# Patient Record
Sex: MALE | Race: WHITE | NOT HISPANIC OR LATINO
[De-identification: names, ages, dates, MRNs, and addresses within clinical notes are randomized per-mention and may not be internally consistent; named-entity substitution may affect disease eponyms.]

---

## 2017-08-09 PROBLEM — Z00.00 ENCOUNTER FOR PREVENTIVE HEALTH EXAMINATION: Status: ACTIVE | Noted: 2017-08-09

## 2017-09-28 ENCOUNTER — APPOINTMENT (OUTPATIENT)
Dept: CV DIAGNOSTICS | Facility: HOSPITAL | Age: 52
End: 2017-09-28

## 2017-10-09 ENCOUNTER — OUTPATIENT (OUTPATIENT)
Dept: OUTPATIENT SERVICES | Facility: HOSPITAL | Age: 52
LOS: 1 days | End: 2017-10-09
Payer: COMMERCIAL

## 2017-10-09 ENCOUNTER — APPOINTMENT (OUTPATIENT)
Dept: CV DIAGNOSTICS | Facility: HOSPITAL | Age: 52
End: 2017-10-09

## 2017-10-09 DIAGNOSIS — I25.10 ATHEROSCLEROTIC HEART DISEASE OF NATIVE CORONARY ARTERY WITHOUT ANGINA PECTORIS: ICD-10-CM

## 2017-10-10 PROCEDURE — 93016 CV STRESS TEST SUPVJ ONLY: CPT

## 2017-10-10 PROCEDURE — 78452 HT MUSCLE IMAGE SPECT MULT: CPT

## 2017-10-10 PROCEDURE — 93018 CV STRESS TEST I&R ONLY: CPT

## 2017-10-10 PROCEDURE — A9500: CPT

## 2017-10-10 PROCEDURE — 93017 CV STRESS TEST TRACING ONLY: CPT

## 2017-10-10 PROCEDURE — 78452 HT MUSCLE IMAGE SPECT MULT: CPT | Mod: 26

## 2017-10-23 ENCOUNTER — OUTPATIENT (OUTPATIENT)
Dept: OUTPATIENT SERVICES | Facility: HOSPITAL | Age: 52
LOS: 1 days | End: 2017-10-23
Payer: COMMERCIAL

## 2017-10-23 VITALS
DIASTOLIC BLOOD PRESSURE: 80 MMHG | SYSTOLIC BLOOD PRESSURE: 124 MMHG | RESPIRATION RATE: 22 BRPM | WEIGHT: 300.05 LBS | OXYGEN SATURATION: 98 % | TEMPERATURE: 98 F | HEART RATE: 75 BPM | HEIGHT: 72 IN

## 2017-10-23 DIAGNOSIS — R94.01 ABNORMAL ELECTROENCEPHALOGRAM [EEG]: ICD-10-CM

## 2017-10-23 DIAGNOSIS — R94.39 ABNORMAL RESULT OF OTHER CARDIOVASCULAR FUNCTION STUDY: ICD-10-CM

## 2017-10-23 DIAGNOSIS — Z98.52 VASECTOMY STATUS: Chronic | ICD-10-CM

## 2017-10-23 DIAGNOSIS — Z90.89 ACQUIRED ABSENCE OF OTHER ORGANS: Chronic | ICD-10-CM

## 2017-10-23 DIAGNOSIS — Z98.890 OTHER SPECIFIED POSTPROCEDURAL STATES: Chronic | ICD-10-CM

## 2017-10-23 LAB
ALBUMIN SERPL ELPH-MCNC: 4.6 G/DL — SIGNIFICANT CHANGE UP (ref 3.3–5)
ALP SERPL-CCNC: 65 U/L — SIGNIFICANT CHANGE UP (ref 40–120)
ALT FLD-CCNC: 46 U/L RC — HIGH (ref 10–45)
ANION GAP SERPL CALC-SCNC: 13 MMOL/L — SIGNIFICANT CHANGE UP (ref 5–17)
AST SERPL-CCNC: 50 U/L — HIGH (ref 10–40)
BILIRUB SERPL-MCNC: 0.4 MG/DL — SIGNIFICANT CHANGE UP (ref 0.2–1.2)
BUN SERPL-MCNC: 24 MG/DL — HIGH (ref 7–23)
CALCIUM SERPL-MCNC: 9.2 MG/DL — SIGNIFICANT CHANGE UP (ref 8.4–10.5)
CHLORIDE SERPL-SCNC: 98 MMOL/L — SIGNIFICANT CHANGE UP (ref 96–108)
CO2 SERPL-SCNC: 27 MMOL/L — SIGNIFICANT CHANGE UP (ref 22–31)
CREAT SERPL-MCNC: 1.09 MG/DL — SIGNIFICANT CHANGE UP (ref 0.5–1.3)
GLUCOSE SERPL-MCNC: 106 MG/DL — HIGH (ref 70–99)
HCT VFR BLD CALC: 44 % — SIGNIFICANT CHANGE UP (ref 39–50)
HGB BLD-MCNC: 15.5 G/DL — SIGNIFICANT CHANGE UP (ref 13–17)
MCHC RBC-ENTMCNC: 30.9 PG — SIGNIFICANT CHANGE UP (ref 27–34)
MCHC RBC-ENTMCNC: 35.2 GM/DL — SIGNIFICANT CHANGE UP (ref 32–36)
MCV RBC AUTO: 87.9 FL — SIGNIFICANT CHANGE UP (ref 80–100)
PLATELET # BLD AUTO: 246 K/UL — SIGNIFICANT CHANGE UP (ref 150–400)
POTASSIUM SERPL-MCNC: 5.4 MMOL/L — HIGH (ref 3.5–5.3)
POTASSIUM SERPL-SCNC: 5.4 MMOL/L — HIGH (ref 3.5–5.3)
PROT SERPL-MCNC: 7.8 G/DL — SIGNIFICANT CHANGE UP (ref 6–8.3)
RBC # BLD: 5.01 M/UL — SIGNIFICANT CHANGE UP (ref 4.2–5.8)
RBC # FLD: 12 % — SIGNIFICANT CHANGE UP (ref 10.3–14.5)
SODIUM SERPL-SCNC: 138 MMOL/L — SIGNIFICANT CHANGE UP (ref 135–145)
WBC # BLD: 7.4 K/UL — SIGNIFICANT CHANGE UP (ref 3.8–10.5)
WBC # FLD AUTO: 7.4 K/UL — SIGNIFICANT CHANGE UP (ref 3.8–10.5)

## 2017-10-23 PROCEDURE — 93458 L HRT ARTERY/VENTRICLE ANGIO: CPT | Mod: 26

## 2017-10-23 PROCEDURE — 99152 MOD SED SAME PHYS/QHP 5/>YRS: CPT

## 2017-10-23 PROCEDURE — C1894: CPT

## 2017-10-23 PROCEDURE — 93571 IV DOP VEL&/PRESS C FLO 1ST: CPT | Mod: 26,RC

## 2017-10-23 PROCEDURE — C1887: CPT

## 2017-10-23 PROCEDURE — 99153 MOD SED SAME PHYS/QHP EA: CPT

## 2017-10-23 PROCEDURE — 85027 COMPLETE CBC AUTOMATED: CPT

## 2017-10-23 PROCEDURE — 93010 ELECTROCARDIOGRAM REPORT: CPT

## 2017-10-23 PROCEDURE — C1769: CPT

## 2017-10-23 PROCEDURE — 93458 L HRT ARTERY/VENTRICLE ANGIO: CPT

## 2017-10-23 PROCEDURE — 93571 IV DOP VEL&/PRESS C FLO 1ST: CPT | Mod: RC

## 2017-10-23 PROCEDURE — 80053 COMPREHEN METABOLIC PANEL: CPT

## 2017-10-23 PROCEDURE — 93005 ELECTROCARDIOGRAM TRACING: CPT

## 2017-10-23 RX ORDER — LOSARTAN POTASSIUM 100 MG/1
1 TABLET, FILM COATED ORAL
Qty: 0 | Refills: 0 | COMMUNITY

## 2017-10-23 RX ORDER — OMEPRAZOLE 10 MG/1
1 CAPSULE, DELAYED RELEASE ORAL
Qty: 0 | Refills: 0 | COMMUNITY

## 2017-10-23 RX ORDER — AMLODIPINE BESYLATE 2.5 MG/1
1 TABLET ORAL
Qty: 0 | Refills: 0 | COMMUNITY

## 2017-10-23 RX ORDER — TADALAFIL 10 MG/1
1 TABLET, FILM COATED ORAL
Qty: 0 | Refills: 0 | COMMUNITY

## 2017-10-23 RX ORDER — FLUTICASONE PROPIONATE 50 MCG
2 SPRAY, SUSPENSION NASAL
Qty: 0 | Refills: 0 | COMMUNITY

## 2017-10-23 RX ORDER — METOPROLOL TARTRATE 50 MG
1 TABLET ORAL
Qty: 0 | Refills: 0 | COMMUNITY

## 2017-10-23 RX ORDER — FUROSEMIDE 40 MG
1 TABLET ORAL
Qty: 0 | Refills: 0 | COMMUNITY

## 2017-10-23 NOTE — H&P CARDIOLOGY - PREVIOUS OFFERED/RESULTED
< from: Nuclear Stress Test-Exercise (10.09.17 @ 09:50) >IMPRESSIONS:Abnormal Study< from: Nuclear Stress Test-Exercise (10.09.17 @ 09:50) >IMPRESSIONS:Abnormal Study

## 2017-10-23 NOTE — H&P CARDIOLOGY - HISTORY OF PRESENT ILLNESS
This is a 51 yo male with PMH of HTN, prediabets, GERD, BENTON on CPAP who  had abnormal stress test and presents for a cardiac angiogram.  The patient  was seen and evaluated by his cardiologist Dr Coffman and was complaining pf pedal edma. HE was started on diuretic and underwent a stress test that was found to be abnormal. He denies any CP, occ palpitations,  ALEXANDER with walking a flight of stair occassionally This is a 51 yo male with PMH of HTN, prediabets, GERD, BENTON on CPAP who  had abnormal stress test and presents for a cardiac angiogram.  The patient  was seen and evaluated by his cardiologist Dr Coffman and was complaining pf pedal edma. HE was started on diuretic and underwent a stress test that was found to be abnormal. He denies any CP, occ palpitations,  ALEXANDER with walking a flight of stair occassionally    < from: Nuclear Stress Test-Exercise (10.09.17 @ 09:50) >  MPRESSIONS:Abnormal Study  * Technically difficult study due to body habitus and  motion artifact.  * Exercise capacity: 10 METS, Average for age and gender.  * Chest Pain: No chest pain with exercise.  * Symptom: No Symptoms.  * HR Response: Appropriate.  * BP Response: Appropriate.  * Heart Rhythm: Sinus Rhythm - 93 BPM.  * Baseline ECG: Poor R wave progression.  Non-specific  ST-T wave abnormality.  * ECG Changes: No significant ischemic ST segment changes.  * Arrhythmia: None.  * Review of raw data shows: Significant motion artfact.  * The left ventricle was normal in size. There is a small,  mild to moderate defect in the apex that is mostly fixed  suggestive of scar with minimal ischemia.  * There are also mild defects in the basal to mid inferior  and basal to mid inferoseptal walls that are mostly fixed,  partially correct with prone imaging, and demonstrate  normal wall motion suggestive of attenuation artifact.  * Post-stress gated wall motion analysis was performed  (LVEF = 57 %;LVEDV = 102 ml.) revealing mildly reduced  systolic thickening of the apex with normal overall left  ventricular ejection fraction.  *** No previous Nuclear/Stress exam.  -----------------------------------------------------------    < end of copied text > This is a 53 yo male with PMH of HTN, prediabets, GERD, BENTON on CPAP who  had abnormal stress test and presents for a cardiac angiogram.  The patient  was seen and evaluated by his cardiologist Dr Coffman and was complaining pf pedal edma. HE was started on diuretic and underwent a stress test  ( results below) that was found to be abnormal. He denies any CP, syncope or near syncope,  occ palpitations,  ALEXANDER with walking a flight of stair occasionally. Patient referred to [facility] for further evaluation    < from: Nuclear Stress Test-Exercise (10.09.17 @ 09:50) >  MPRESSIONS:Abnormal Study  * Technically difficult study due to body habitus and  motion artifact.  * Exercise capacity: 10 METS, Average for age and gender.  * Chest Pain: No chest pain with exercise.  * Symptom: No Symptoms.  * HR Response: Appropriate.  * BP Response: Appropriate.  * Heart Rhythm: Sinus Rhythm - 93 BPM.  * Baseline ECG: Poor R wave progression.  Non-specific  ST-T wave abnormality.  * ECG Changes: No significant ischemic ST segment changes.  * Arrhythmia: None.  * Review of raw data shows: Significant motion artfact.  * The left ventricle was normal in size. There is a small,  mild to moderate defect in the apex that is mostly fixed  suggestive of scar with minimal ischemia.  * There are also mild defects in the basal to mid inferior  and basal to mid inferoseptal walls that are mostly fixed,  partially correct with prone imaging, and demonstrate  normal wall motion suggestive of attenuation artifact.  * Post-stress gated wall motion analysis was performed  (LVEF = 57 %;LVEDV = 102 ml.) revealing mildly reduced  systolic thickening of the apex with normal overall left  ventricular ejection fraction.  *** No previous Nuclear/Stress exam.  -----------------------------------------------------------    < end of copied text >

## 2017-10-23 NOTE — H&P CARDIOLOGY - PMH
Erectile dysfunction    GERD (gastroesophageal reflux disease)    HTN (hypertension)    BENTON on CPAP    Pedal edema    Prediabetes

## 2019-07-23 PROBLEM — G47.33 OBSTRUCTIVE SLEEP APNEA (ADULT) (PEDIATRIC): Chronic | Status: ACTIVE | Noted: 2017-10-23

## 2019-07-23 PROBLEM — R60.0 LOCALIZED EDEMA: Chronic | Status: ACTIVE | Noted: 2017-10-23

## 2019-07-23 PROBLEM — I10 ESSENTIAL (PRIMARY) HYPERTENSION: Chronic | Status: ACTIVE | Noted: 2017-10-23

## 2019-07-23 PROBLEM — K21.9 GASTRO-ESOPHAGEAL REFLUX DISEASE WITHOUT ESOPHAGITIS: Chronic | Status: ACTIVE | Noted: 2017-10-23

## 2019-07-23 PROBLEM — R73.03 PREDIABETES: Chronic | Status: ACTIVE | Noted: 2017-10-23

## 2019-07-23 PROBLEM — N52.9 MALE ERECTILE DYSFUNCTION, UNSPECIFIED: Chronic | Status: ACTIVE | Noted: 2017-10-23

## 2019-07-29 ENCOUNTER — APPOINTMENT (OUTPATIENT)
Dept: CARDIOLOGY | Facility: CLINIC | Age: 54
End: 2019-07-29

## 2019-07-30 ENCOUNTER — TRANSCRIPTION ENCOUNTER (OUTPATIENT)
Age: 54
End: 2019-07-30

## 2019-08-07 ENCOUNTER — APPOINTMENT (OUTPATIENT)
Dept: CARDIOLOGY | Facility: CLINIC | Age: 54
End: 2019-08-07

## 2019-08-27 ENCOUNTER — NON-APPOINTMENT (OUTPATIENT)
Age: 54
End: 2019-08-27

## 2019-08-27 ENCOUNTER — APPOINTMENT (OUTPATIENT)
Dept: CARDIOLOGY | Facility: CLINIC | Age: 54
End: 2019-08-27
Payer: COMMERCIAL

## 2019-08-27 VITALS
HEART RATE: 72 BPM | WEIGHT: 264 LBS | SYSTOLIC BLOOD PRESSURE: 130 MMHG | BODY MASS INDEX: 36.15 KG/M2 | DIASTOLIC BLOOD PRESSURE: 90 MMHG | HEIGHT: 71.5 IN

## 2019-08-27 DIAGNOSIS — Z82.49 FAMILY HISTORY OF ISCHEMIC HEART DISEASE AND OTHER DISEASES OF THE CIRCULATORY SYSTEM: ICD-10-CM

## 2019-08-27 DIAGNOSIS — Z78.9 OTHER SPECIFIED HEALTH STATUS: ICD-10-CM

## 2019-08-27 DIAGNOSIS — Z83.3 FAMILY HISTORY OF DIABETES MELLITUS: ICD-10-CM

## 2019-08-27 PROCEDURE — 93000 ELECTROCARDIOGRAM COMPLETE: CPT

## 2019-08-27 PROCEDURE — 99205 OFFICE O/P NEW HI 60 MIN: CPT

## 2019-08-27 RX ORDER — ASPIRIN ENTERIC COATED TABLETS 81 MG 81 MG/1
81 TABLET, DELAYED RELEASE ORAL
Qty: 90 | Refills: 3 | Status: ACTIVE | COMMUNITY
Start: 2019-08-27

## 2019-08-27 RX ORDER — TADALAFIL 5 MG/1
5 TABLET, FILM COATED ORAL
Refills: 0 | Status: ACTIVE | COMMUNITY

## 2019-11-05 ENCOUNTER — APPOINTMENT (OUTPATIENT)
Dept: INTERNAL MEDICINE | Facility: CLINIC | Age: 54
End: 2019-11-05
Payer: COMMERCIAL

## 2019-11-05 VITALS
HEART RATE: 78 BPM | SYSTOLIC BLOOD PRESSURE: 120 MMHG | HEIGHT: 71.5 IN | DIASTOLIC BLOOD PRESSURE: 74 MMHG | OXYGEN SATURATION: 98 % | WEIGHT: 260 LBS | TEMPERATURE: 97.8 F | BODY MASS INDEX: 35.6 KG/M2

## 2019-11-05 DIAGNOSIS — Z12.11 ENCOUNTER FOR SCREENING FOR MALIGNANT NEOPLASM OF COLON: ICD-10-CM

## 2019-11-05 DIAGNOSIS — Z80.43 FAMILY HISTORY OF MALIGNANT NEOPLASM OF TESTIS: ICD-10-CM

## 2019-11-05 DIAGNOSIS — R21 RASH AND OTHER NONSPECIFIC SKIN ERUPTION: ICD-10-CM

## 2019-11-05 DIAGNOSIS — B35.3 TINEA PEDIS: ICD-10-CM

## 2019-11-05 DIAGNOSIS — H91.93 UNSPECIFIED HEARING LOSS, BILATERAL: ICD-10-CM

## 2019-11-05 DIAGNOSIS — Z86.79 PERSONAL HISTORY OF OTHER DISEASES OF THE CIRCULATORY SYSTEM: ICD-10-CM

## 2019-11-05 DIAGNOSIS — Z78.9 OTHER SPECIFIED HEALTH STATUS: ICD-10-CM

## 2019-11-05 DIAGNOSIS — M67.40 GANGLION, UNSPECIFIED SITE: ICD-10-CM

## 2019-11-05 DIAGNOSIS — Z86.69 PERSONAL HISTORY OF OTHER DISEASES OF THE NERVOUS SYSTEM AND SENSE ORGANS: ICD-10-CM

## 2019-11-05 DIAGNOSIS — R73.03 PREDIABETES.: ICD-10-CM

## 2019-11-05 PROCEDURE — 90682 RIV4 VACC RECOMBINANT DNA IM: CPT

## 2019-11-05 PROCEDURE — G0008: CPT

## 2019-11-05 PROCEDURE — 99204 OFFICE O/P NEW MOD 45 MIN: CPT | Mod: 25

## 2019-11-05 PROCEDURE — G0442 ANNUAL ALCOHOL SCREEN 15 MIN: CPT

## 2019-11-05 PROCEDURE — G0444 DEPRESSION SCREEN ANNUAL: CPT

## 2019-11-05 PROCEDURE — 82270 OCCULT BLOOD FECES: CPT

## 2019-11-23 ENCOUNTER — APPOINTMENT (OUTPATIENT)
Dept: INTERNAL MEDICINE | Facility: CLINIC | Age: 54
End: 2019-11-23
Payer: COMMERCIAL

## 2019-11-23 ENCOUNTER — LABORATORY RESULT (OUTPATIENT)
Age: 54
End: 2019-11-23

## 2019-11-23 PROCEDURE — 36415 COLL VENOUS BLD VENIPUNCTURE: CPT

## 2019-12-02 ENCOUNTER — CHART COPY (OUTPATIENT)
Age: 54
End: 2019-12-02

## 2019-12-02 LAB
ALBUMIN SERPL ELPH-MCNC: 4.9 G/DL
ALP BLD-CCNC: 72 U/L
ALT SERPL-CCNC: 25 U/L
ANION GAP SERPL CALC-SCNC: 12 MMOL/L
APPEARANCE: CLEAR
AST SERPL-CCNC: 20 U/L
BACTERIA: NEGATIVE
BASOPHILS # BLD AUTO: 0.04 K/UL
BASOPHILS NFR BLD AUTO: 0.7 %
BILIRUB SERPL-MCNC: 0.6 MG/DL
BILIRUBIN URINE: NEGATIVE
BLOOD URINE: NEGATIVE
BUN SERPL-MCNC: 17 MG/DL
CALCIUM SERPL-MCNC: 9.4 MG/DL
CHLORIDE SERPL-SCNC: 104 MMOL/L
CHOLEST SERPL-MCNC: 96 MG/DL
CHOLEST/HDLC SERPL: 2.9 RATIO
CO2 SERPL-SCNC: 24 MMOL/L
COLOR: YELLOW
CREAT SERPL-MCNC: 1.11 MG/DL
DATE COLLECTED: NORMAL
EOSINOPHIL # BLD AUTO: 0.14 K/UL
EOSINOPHIL NFR BLD AUTO: 2.3 %
ESTIMATED AVERAGE GLUCOSE: 117 MG/DL
GLUCOSE QUALITATIVE U: NEGATIVE
GLUCOSE SERPL-MCNC: 109 MG/DL
HBA1C MFR BLD HPLC: 5.7 %
HCT VFR BLD CALC: 53.7 %
HDLC SERPL-MCNC: 33 MG/DL
HEMOCCULT SP1 STL QL: NEGATIVE
HGB BLD-MCNC: 18.3 G/DL
HYALINE CASTS: 3 /LPF
IMM GRANULOCYTES NFR BLD AUTO: 0.3 %
KETONES URINE: NEGATIVE
LDLC SERPL CALC-MCNC: 51 MG/DL
LEUKOCYTE ESTERASE URINE: NEGATIVE
LYMPHOCYTES # BLD AUTO: 1.61 K/UL
LYMPHOCYTES NFR BLD AUTO: 26.6 %
MAN DIFF?: NORMAL
MCHC RBC-ENTMCNC: 29.5 PG
MCHC RBC-ENTMCNC: 34.1 GM/DL
MCV RBC AUTO: 86.6 FL
MICROSCOPIC-UA: NORMAL
MONOCYTES # BLD AUTO: 0.36 K/UL
MONOCYTES NFR BLD AUTO: 5.9 %
NEUTROPHILS # BLD AUTO: 3.89 K/UL
NEUTROPHILS NFR BLD AUTO: 64.2 %
NITRITE URINE: NEGATIVE
PH URINE: 6
PLATELET # BLD AUTO: 112 K/UL
POTASSIUM SERPL-SCNC: 4.3 MMOL/L
PROT SERPL-MCNC: 7.1 G/DL
PROTEIN URINE: NORMAL
PSA FREE FLD-MCNC: 30 %
PSA FREE SERPL-MCNC: 0.21 NG/ML
PSA SERPL-MCNC: 0.7 NG/ML
QUALITY CONTROL: YES
RBC # BLD: 6.2 M/UL
RBC # FLD: 12.7 %
RED BLOOD CELLS URINE: 1 /HPF
SODIUM SERPL-SCNC: 140 MMOL/L
SPECIFIC GRAVITY URINE: 1.03
SQUAMOUS EPITHELIAL CELLS: 1 /HPF
TRIGL SERPL-MCNC: 61 MG/DL
TSH SERPL-ACNC: 3.89 UIU/ML
UROBILINOGEN URINE: NORMAL
WBC # FLD AUTO: 6.06 K/UL
WHITE BLOOD CELLS URINE: 1 /HPF

## 2019-12-05 ENCOUNTER — APPOINTMENT (OUTPATIENT)
Dept: INTERNAL MEDICINE | Facility: CLINIC | Age: 54
End: 2019-12-05

## 2019-12-12 ENCOUNTER — APPOINTMENT (OUTPATIENT)
Dept: INTERNAL MEDICINE | Facility: CLINIC | Age: 54
End: 2019-12-12
Payer: COMMERCIAL

## 2019-12-12 DIAGNOSIS — D58.2 OTHER HEMOGLOBINOPATHIES: ICD-10-CM

## 2019-12-12 PROCEDURE — 36415 COLL VENOUS BLD VENIPUNCTURE: CPT

## 2019-12-18 ENCOUNTER — APPOINTMENT (OUTPATIENT)
Dept: HEMATOLOGY ONCOLOGY | Facility: CLINIC | Age: 54
End: 2019-12-18
Payer: COMMERCIAL

## 2019-12-18 ENCOUNTER — APPOINTMENT (OUTPATIENT)
Dept: CARDIOLOGY | Facility: CLINIC | Age: 54
End: 2019-12-18

## 2019-12-18 VITALS
HEIGHT: 71 IN | WEIGHT: 259 LBS | HEART RATE: 66 BPM | SYSTOLIC BLOOD PRESSURE: 123 MMHG | DIASTOLIC BLOOD PRESSURE: 75 MMHG | OXYGEN SATURATION: 98 % | BODY MASS INDEX: 36.26 KG/M2 | RESPIRATION RATE: 18 BRPM | TEMPERATURE: 98.6 F

## 2019-12-18 PROCEDURE — 99205 OFFICE O/P NEW HI 60 MIN: CPT

## 2019-12-18 NOTE — ASSESSMENT
[FreeTextEntry1] : Erythrocytosis\par Thrombocytopenia\par \par Extensively discussed about the differential diagnoses\par Repeat blood work few days ago showed normal counts- ? lab artefact\par He reports feeling well and denies any symptoms\par Has intentionally lost weight and feels amazing\par Denies taking any OTC meds or herbal products or any anabolic steroids/ testosterone\par Has BENTON and used CPAP at night\par NO fatigue, itching\par No B symptoms\par \par Plan to repeat blood work in 1 month. Further work up PRN\par CBC, CMP, erythropoietin, LDH, iron studies, ferritin, hemochromatosis gene study, ESR, CRP

## 2019-12-18 NOTE — REVIEW OF SYSTEMS
[Recent Change In Weight] : ~T recent weight change [Loss of Hearing] : loss of hearing [Negative] : Allergic/Immunologic [FreeTextEntry4] : +hearing aids  [de-identified] : +tingly on big toes

## 2019-12-18 NOTE — REASON FOR VISIT
[Initial Consultation] : an initial consultation for [FreeTextEntry2] : erythrocytosis and thrombocytopenia

## 2019-12-18 NOTE — CONSULT LETTER
[Dear  ___] : Dear  [unfilled], [Consult Letter:] : I had the pleasure of evaluating your patient, [unfilled]. [Please see my note below.] : Please see my note below. [Consult Closing:] : Thank you very much for allowing me to participate in the care of this patient.  If you have any questions, please do not hesitate to contact me. [FreeTextEntry3] : Ted Orozco MD, MPH\par Attending Physician\par Hematology Oncology\par [facility] [place]\par [facility]\par

## 2019-12-18 NOTE — HISTORY OF PRESENT ILLNESS
[de-identified] : Mr. Sincere Baptiste is 54 year old with sleep apnea, HNT, chronic hearing loss, gerd, prediabetes referred by Dr. Juliane Gaspar for further evaluation of erythrocytosis and thrombocytopenia. Pt had blood work done on 11/23/2019 WBC 6.06 Hgb 18.3 Hct 53.7 Plts 112 with recheck blood work  on 12/12/2019 wbc 7.15 hgb 14.4 hct 44.6 plts 213.\par \par Pt had been told by Dr. Landry (cardiologist) to decrease his fluid and salt intake in fall of 2018 due to pitting edema and uncontrolled HTN. Pt also had nonobstructive ASHD (50% RCA) wit normal exercise nuclear stress testing in 1/2019, normal LVEF with mild diastolic dysfunction. He's now being followed by Dr. Larry Gordon.\par \par Patient with intentional weight loss of 60 lbs since January 2018 due to life style modifications.  Patient denies of any hematologic blood disorder but his father had lung cancer (heavy smoker) at age 56, mother with CHF/diabetes, younger brother with testicular cancer at age 21.  \par \par He has BENTON and uses CPAP at night\par \par No fevers chills night sweats\par No fatigue, tiredness, apathy\par No chest pain, shortness of breath, palpitation, dizziness\par No abdominal pain, nausea, vomiting, diarrhea, constipation\par No bright red blood per rectum, hematemesis or melena\par No hematuria, dysuria, frequency, urgency\par No headaches, visual changes, focal weakness or numbness\par \par \par

## 2020-01-10 ENCOUNTER — RX RENEWAL (OUTPATIENT)
Age: 55
End: 2020-01-10

## 2020-01-15 ENCOUNTER — APPOINTMENT (OUTPATIENT)
Dept: PULMONOLOGY | Facility: CLINIC | Age: 55
End: 2020-01-15
Payer: COMMERCIAL

## 2020-01-15 ENCOUNTER — APPOINTMENT (OUTPATIENT)
Dept: HEMATOLOGY ONCOLOGY | Facility: CLINIC | Age: 55
End: 2020-01-15

## 2020-01-15 VITALS
SYSTOLIC BLOOD PRESSURE: 138 MMHG | DIASTOLIC BLOOD PRESSURE: 80 MMHG | HEART RATE: 79 BPM | OXYGEN SATURATION: 98 % | HEIGHT: 71 IN | BODY MASS INDEX: 36.68 KG/M2 | WEIGHT: 262 LBS

## 2020-01-15 LAB — EPWORTH SCORE: 10

## 2020-01-15 PROCEDURE — 99244 OFF/OP CNSLTJ NEW/EST MOD 40: CPT

## 2020-01-16 NOTE — REVIEW OF SYSTEMS
[Recent Wt Gain (___ Lbs)] : recent [unfilled] ~Ulb weight gain [Recent Wt Loss (___ Lbs)] : recent [unfilled] ~Ulb weight loss [As Noted in HPI] : as noted in HPI [Nasal Congestion] : nasal congestion [Postnasal Drip] : postnasal drip

## 2020-01-16 NOTE — HISTORY OF PRESENT ILLNESS
[Difficulty Breathing During Exertion] : denies dyspnea on exertion [Feelings Of Weakness On Exertion] : denies exercise intolerance [Cough] : denies coughing [Regional Soft Tissue Swelling Both Lower Extremities] : denies lower extremity edema [Wheezing] : denies wheezing [Chest Pain Or Discomfort] : denies chest pain [Fever] : denies fever [Wt Loss ___ Lbs] : recent [unfilled] ~Upound(s) weight loss [2  -  Slight] : 2, slight [Class II - Mild Symptoms and Slight Limitations] : II [Never] : was never a smoker [Obstructive Sleep Apnea] : obstructive sleep apnea [Snoring] : snoring [Witnessed Apneas] : witnessed sleep apnea [Frequent Nocturnal Awakening] : frequent nocturnal awakening [Daytime Somnolence] : daytime somnolence [ESS: ___] : ESS score [unfilled] [Oxygen] : the patient uses no supplemental oxygen [FreeTextEntry1] : I was asked to consult on this patient for BENTON by \par The patient has been diagnosed with BENTON since his home study in 2016 and was prescribed APap but since he has moved from [place] to [place] he needed a new sleep Dr. he also has been having difficulty obtaining new supplies and while he has done well with the MRI mask is not received a new mask and while and has been obtaining supplies from [employer] and [employer]. He uses his APAP every night but has been having difficulty tolerating the mask of late. He has lost approximately 90 pounds since his diagnosis in trying to lose more. He has gained some weight over the holidays.  He does have chronic nasal congestion and postnasal drip.

## 2020-01-16 NOTE — PHYSICAL EXAM
[General Appearance - In No Acute Distress] : no acute distress [Low Lying Soft Palate] : low lying soft palate [Elongated Uvula] : elongated uvula [Enlarged Base of the Tongue] : enlargement of the base of the tongue [Erythema] : erythema of the pharynx [IV] : IV [Heart Rate And Rhythm] : heart rate and rhythm were normal [Heart Sounds] : normal S1 and S2 [] : no respiratory distress [Auscultation Breath Sounds / Voice Sounds] : lungs were clear to auscultation bilaterally [Kyphosis] : kyphosis [Bowel Sounds] : normal bowel sounds [Abnormal Walk] : normal gait [Nail Clubbing] : no clubbing of the fingernails [Cyanosis, Localized] : no localized cyanosis [FreeTextEntry1] : no edema [Skin Color & Pigmentation] : normal skin color and pigmentation [Cranial Nerves] : cranial nerves 2-12 were intact [Oriented To Time, Place, And Person] : oriented to person, place, and time

## 2020-01-16 NOTE — PROCEDURE
[FreeTextEntry1] : HST  4/14/2016 AHI- 69, lowest sat 54%\par APAP [X]  30 day [] 90 day compliance Dates:  [12/13=1/14/2020] \par Settings: [4-20] cm F[]  Andreina [] small [X]Med []  Lg . []  Nasal\par %compliance:[100]  \par Pressure:  95thile%  [10]cm max []cm\par Average use:  [6:00] \par Leak: [] L/ min,  max []L/ min   [] L/ hrs  []min\par AHI = [2] \par

## 2020-01-31 ENCOUNTER — RESULT REVIEW (OUTPATIENT)
Age: 55
End: 2020-01-31

## 2020-01-31 ENCOUNTER — APPOINTMENT (OUTPATIENT)
Dept: HEMATOLOGY ONCOLOGY | Facility: CLINIC | Age: 55
End: 2020-01-31
Payer: COMMERCIAL

## 2020-01-31 ENCOUNTER — APPOINTMENT (OUTPATIENT)
Dept: GASTROENTEROLOGY | Facility: CLINIC | Age: 55
End: 2020-01-31
Payer: COMMERCIAL

## 2020-01-31 VITALS
RESPIRATION RATE: 18 BRPM | DIASTOLIC BLOOD PRESSURE: 84 MMHG | WEIGHT: 261 LBS | BODY MASS INDEX: 36.54 KG/M2 | TEMPERATURE: 98.6 F | OXYGEN SATURATION: 98 % | HEART RATE: 78 BPM | HEIGHT: 71 IN | SYSTOLIC BLOOD PRESSURE: 128 MMHG

## 2020-01-31 VITALS
DIASTOLIC BLOOD PRESSURE: 86 MMHG | HEART RATE: 73 BPM | SYSTOLIC BLOOD PRESSURE: 134 MMHG | WEIGHT: 258 LBS | BODY MASS INDEX: 36.12 KG/M2 | HEIGHT: 71 IN

## 2020-01-31 DIAGNOSIS — D69.6 THROMBOCYTOPENIA, UNSPECIFIED: ICD-10-CM

## 2020-01-31 PROCEDURE — 99214 OFFICE O/P EST MOD 30 MIN: CPT

## 2020-01-31 PROCEDURE — 99244 OFF/OP CNSLTJ NEW/EST MOD 40: CPT

## 2020-01-31 NOTE — ASSESSMENT
[FreeTextEntry1] : Erythrocytosis\par Thrombocytopenia\par \par Likely viral infection vs lab artefact\par He has BENTON on CPAP\par Repeat blood work x 2 show normal counts\par Has intentionally lost weight and feels amazing\par At this time, I have advised him to follow up with Dr Gaspar and have CBC Q6-12 months\par Will discuss erythropoietin, ferritin, hemochromatosis gene study results over the phone\par \par No follow up unless pending blood work comes back abnormal\par Follow up PRN

## 2020-01-31 NOTE — CONSULT LETTER
[Courtesy Letter:] : I had the pleasure of seeing your patient, [unfilled], in my office today. [FreeTextEntry3] : Ted Orozco MD, MPH\par Attending Physician\par Hematology Oncology\par [facility] [place]\par [facility]\par

## 2020-01-31 NOTE — HISTORY OF PRESENT ILLNESS
[de-identified] : Mr. Sincere Baptiste is 54 year old with sleep apnea, HNT, chronic hearing loss, gerd, prediabetes referred by Dr. Juliane Gaspar for further evaluation of erythrocytosis and thrombocytopenia. Pt had blood work done on 11/23/2019 WBC 6.06 Hgb 18.3 Hct 53.7 Plts 112 with recheck blood work  on 12/12/2019 wbc 7.15 hgb 14.4 hct 44.6 plts 213.\par \par Pt had been told by Dr. Landry (cardiologist) to decrease his fluid and salt intake in fall of 2018 due to pitting edema and uncontrolled HTN. Pt also had nonobstructive ASHD (50% RCA) wit normal exercise nuclear stress testing in 1/2019, normal LVEF with mild diastolic dysfunction. He's now being followed by Dr. Larry Gordon.\par \par Patient with intentional weight loss of 60 lbs since January 2018 due to life style modifications.  Patient denies of any hematologic blood disorder but his father had lung cancer (heavy smoker) at age 56, mother with CHF/diabetes, younger brother with testicular cancer at age 21.  \par \par He has BENTON and uses CPAP at night\par \par No fevers chills night sweats\par No fatigue, tiredness, apathy\par No chest pain, shortness of breath, palpitation, dizziness\par No abdominal pain, nausea, vomiting, diarrhea, constipation\par No bright red blood per rectum, hematemesis or melena\par No hematuria, dysuria, frequency, urgency\par No headaches, visual changes, focal weakness or numbness\par \par \par  [de-identified] : He is seen today for follow up\par \par Overall feels good\par He is back to doing karate and has intentionally lost 70 lbs\par He uses CPAP at night for BENTON

## 2020-01-31 NOTE — ASSESSMENT
[FreeTextEntry1] : Will plan on an upper endoscopy for GERD.  Explained risks/benefits/alternatives including not limited to bleeding, infection, perforation, missed lesions, anesthesia complications.  Patient understands and agrees, all questions answered.\par \par Will plan on a colonoscopy for screening.  Explained risks/benefits/alternatives including not limited to bleeding, infection, perforation, missed lesions, anesthesia complications.  Patient understands and agrees, all questions answered.  Will use a split dose miralax/gatorade prep with clears the day prior.\par \par If he continues to lose weight, pending his EGD findings, would consider trying to ween off of his PPI in 3-6 months.\par \par Thank you for referring Mr. FERNANDO.  Please do not hesitate to call to further discuss his/her care.\par \par Note faxed to requesting MD.\par \par

## 2020-02-25 ENCOUNTER — APPOINTMENT (OUTPATIENT)
Dept: CARDIOLOGY | Facility: CLINIC | Age: 55
End: 2020-02-25
Payer: COMMERCIAL

## 2020-02-25 ENCOUNTER — NON-APPOINTMENT (OUTPATIENT)
Age: 55
End: 2020-02-25

## 2020-02-25 VITALS
BODY MASS INDEX: 36.54 KG/M2 | HEIGHT: 71 IN | SYSTOLIC BLOOD PRESSURE: 126 MMHG | DIASTOLIC BLOOD PRESSURE: 80 MMHG | WEIGHT: 261 LBS | HEART RATE: 61 BPM

## 2020-02-25 PROCEDURE — 93000 ELECTROCARDIOGRAM COMPLETE: CPT

## 2020-02-25 PROCEDURE — 99214 OFFICE O/P EST MOD 30 MIN: CPT

## 2020-02-28 ENCOUNTER — APPOINTMENT (OUTPATIENT)
Dept: PULMONOLOGY | Facility: CLINIC | Age: 55
End: 2020-02-28
Payer: COMMERCIAL

## 2020-02-28 VITALS
WEIGHT: 261 LBS | SYSTOLIC BLOOD PRESSURE: 128 MMHG | DIASTOLIC BLOOD PRESSURE: 80 MMHG | BODY MASS INDEX: 36.54 KG/M2 | OXYGEN SATURATION: 99 % | HEIGHT: 71 IN | HEART RATE: 76 BPM

## 2020-02-28 PROCEDURE — 94010 BREATHING CAPACITY TEST: CPT

## 2020-02-28 PROCEDURE — 95012 NITRIC OXIDE EXP GAS DETER: CPT

## 2020-02-28 PROCEDURE — 99215 OFFICE O/P EST HI 40 MIN: CPT | Mod: 25

## 2020-03-13 RX ORDER — OLMESARTAN MEDOXOMIL 40 MG/1
40 TABLET, FILM COATED ORAL
Qty: 90 | Refills: 3 | Status: DISCONTINUED | COMMUNITY
Start: 2020-03-13 | End: 2020-03-13

## 2020-03-19 ENCOUNTER — APPOINTMENT (OUTPATIENT)
Dept: INTERNAL MEDICINE | Facility: CLINIC | Age: 55
End: 2020-03-19

## 2020-03-27 LAB
BASOPHILS # BLD AUTO: 0.05 K/UL
BASOPHILS NFR BLD AUTO: 0.7 %
EOSINOPHIL # BLD AUTO: 0.11 K/UL
EOSINOPHIL NFR BLD AUTO: 1.5 %
HCT VFR BLD CALC: 44.6 %
HGB BLD-MCNC: 14.4 G/DL
IMM GRANULOCYTES NFR BLD AUTO: 0.1 %
LYMPHOCYTES # BLD AUTO: 1.74 K/UL
LYMPHOCYTES NFR BLD AUTO: 24.3 %
MAN DIFF?: NORMAL
MCHC RBC-ENTMCNC: 29.4 PG
MCHC RBC-ENTMCNC: 32.3 GM/DL
MCV RBC AUTO: 91 FL
MONOCYTES # BLD AUTO: 0.56 K/UL
MONOCYTES NFR BLD AUTO: 7.8 %
NEUTROPHILS # BLD AUTO: 4.68 K/UL
NEUTROPHILS NFR BLD AUTO: 65.6 %
PLATELET # BLD AUTO: 213 K/UL
RBC # BLD: 4.9 M/UL
RBC # FLD: 13.2 %
WBC # FLD AUTO: 7.15 K/UL

## 2020-05-18 ENCOUNTER — APPOINTMENT (OUTPATIENT)
Dept: INTERNAL MEDICINE | Facility: CLINIC | Age: 55
End: 2020-05-18
Payer: COMMERCIAL

## 2020-05-18 VITALS
HEART RATE: 78 BPM | OXYGEN SATURATION: 98 % | TEMPERATURE: 99.1 F | HEIGHT: 71 IN | BODY MASS INDEX: 35.14 KG/M2 | SYSTOLIC BLOOD PRESSURE: 138 MMHG | WEIGHT: 251 LBS | DIASTOLIC BLOOD PRESSURE: 88 MMHG

## 2020-05-18 DIAGNOSIS — R20.2 ANESTHESIA OF SKIN: ICD-10-CM

## 2020-05-18 DIAGNOSIS — Z20.828 CONTACT WITH AND (SUSPECTED) EXPOSURE TO OTHER VIRAL COMMUNICABLE DISEASES: ICD-10-CM

## 2020-05-18 DIAGNOSIS — R20.0 ANESTHESIA OF SKIN: ICD-10-CM

## 2020-05-18 DIAGNOSIS — D75.1 SECONDARY POLYCYTHEMIA: ICD-10-CM

## 2020-05-18 PROCEDURE — 36415 COLL VENOUS BLD VENIPUNCTURE: CPT

## 2020-05-18 PROCEDURE — 99214 OFFICE O/P EST MOD 30 MIN: CPT | Mod: 25

## 2020-05-21 LAB
ALBUMIN SERPL ELPH-MCNC: 5.1 G/DL
ALP BLD-CCNC: 61 U/L
ALT SERPL-CCNC: 25 U/L
ANION GAP SERPL CALC-SCNC: 13 MMOL/L
AST SERPL-CCNC: 22 U/L
BASOPHILS # BLD AUTO: 0.04 K/UL
BASOPHILS NFR BLD AUTO: 0.7 %
BILIRUB SERPL-MCNC: 0.6 MG/DL
BUN SERPL-MCNC: 20 MG/DL
CALCIUM SERPL-MCNC: 9.6 MG/DL
CHLORIDE SERPL-SCNC: 102 MMOL/L
CO2 SERPL-SCNC: 26 MMOL/L
CREAT SERPL-MCNC: 0.86 MG/DL
EOSINOPHIL # BLD AUTO: 0 K/UL
EOSINOPHIL NFR BLD AUTO: 0 %
ESTIMATED AVERAGE GLUCOSE: 114 MG/DL
FOLATE SERPL-MCNC: 12.7 NG/ML
GLUCOSE SERPL-MCNC: 108 MG/DL
HBA1C MFR BLD HPLC: 5.6 %
HCT VFR BLD CALC: 44.2 %
HGB BLD-MCNC: 14.5 G/DL
IMM GRANULOCYTES NFR BLD AUTO: 0.2 %
LYMPHOCYTES # BLD AUTO: 1.45 K/UL
LYMPHOCYTES NFR BLD AUTO: 24.2 %
MAN DIFF?: NORMAL
MCHC RBC-ENTMCNC: 29.2 PG
MCHC RBC-ENTMCNC: 32.8 GM/DL
MCV RBC AUTO: 88.9 FL
MONOCYTES # BLD AUTO: 0.54 K/UL
MONOCYTES NFR BLD AUTO: 9 %
NEUTROPHILS # BLD AUTO: 3.96 K/UL
NEUTROPHILS NFR BLD AUTO: 65.9 %
PLATELET # BLD AUTO: 196 K/UL
POTASSIUM SERPL-SCNC: 4.3 MMOL/L
PROT SERPL-MCNC: 7.2 G/DL
RBC # BLD: 4.97 M/UL
RBC # FLD: 12.7 %
SARS-COV-2 IGG SERPL IA-ACNC: 0.1 INDEX
SARS-COV-2 IGG SERPL QL IA: NEGATIVE
SODIUM SERPL-SCNC: 141 MMOL/L
TSH SERPL-ACNC: 3.48 UIU/ML
VIT B12 SERPL-MCNC: 1727 PG/ML
WBC # FLD AUTO: 6 K/UL

## 2020-06-10 ENCOUNTER — RESULT REVIEW (OUTPATIENT)
Age: 55
End: 2020-06-10

## 2020-06-11 ENCOUNTER — RESULT REVIEW (OUTPATIENT)
Age: 55
End: 2020-06-11

## 2020-06-12 ENCOUNTER — APPOINTMENT (OUTPATIENT)
Dept: GASTROENTEROLOGY | Facility: HOSPITAL | Age: 55
End: 2020-06-12

## 2020-06-23 DIAGNOSIS — K52.9 NONINFECTIVE GASTROENTERITIS AND COLITIS, UNSPECIFIED: ICD-10-CM

## 2020-09-10 ENCOUNTER — RESULT REVIEW (OUTPATIENT)
Age: 55
End: 2020-09-10

## 2020-09-22 ENCOUNTER — APPOINTMENT (OUTPATIENT)
Dept: CARDIOLOGY | Facility: CLINIC | Age: 55
End: 2020-09-22
Payer: COMMERCIAL

## 2020-09-22 VITALS
DIASTOLIC BLOOD PRESSURE: 90 MMHG | WEIGHT: 264 LBS | SYSTOLIC BLOOD PRESSURE: 140 MMHG | BODY MASS INDEX: 36.96 KG/M2 | HEIGHT: 71 IN | HEART RATE: 71 BPM

## 2020-09-22 PROCEDURE — 93000 ELECTROCARDIOGRAM COMPLETE: CPT

## 2020-09-22 PROCEDURE — 99215 OFFICE O/P EST HI 40 MIN: CPT

## 2020-09-22 RX ORDER — OMEPRAZOLE 40 MG/1
40 CAPSULE, DELAYED RELEASE ORAL
Qty: 30 | Refills: 1 | Status: ACTIVE | COMMUNITY

## 2020-09-22 RX ORDER — MULTIVIT-MIN/FOLIC/VIT K/LYCOP 400-300MCG
50 MCG TABLET ORAL
Refills: 0 | Status: ACTIVE | COMMUNITY

## 2020-09-22 RX ORDER — NYSTATIN 100000 [USP'U]/G
100000 CREAM TOPICAL TWICE DAILY
Qty: 30 | Refills: 3 | Status: DISCONTINUED | COMMUNITY
Start: 2019-11-05 | End: 2020-09-22

## 2020-12-10 ENCOUNTER — APPOINTMENT (OUTPATIENT)
Dept: INTERNAL MEDICINE | Facility: CLINIC | Age: 55
End: 2020-12-10

## 2020-12-17 ENCOUNTER — RX RENEWAL (OUTPATIENT)
Age: 55
End: 2020-12-17

## 2021-01-19 ENCOUNTER — NON-APPOINTMENT (OUTPATIENT)
Age: 56
End: 2021-01-19

## 2021-01-19 ENCOUNTER — APPOINTMENT (OUTPATIENT)
Dept: CARDIOLOGY | Facility: CLINIC | Age: 56
End: 2021-01-19
Payer: COMMERCIAL

## 2021-01-19 VITALS
SYSTOLIC BLOOD PRESSURE: 115 MMHG | HEART RATE: 70 BPM | BODY MASS INDEX: 36.96 KG/M2 | HEIGHT: 71 IN | DIASTOLIC BLOOD PRESSURE: 75 MMHG | WEIGHT: 264 LBS

## 2021-01-19 PROCEDURE — 93000 ELECTROCARDIOGRAM COMPLETE: CPT

## 2021-01-19 PROCEDURE — 99072 ADDL SUPL MATRL&STAF TM PHE: CPT

## 2021-01-19 PROCEDURE — 99215 OFFICE O/P EST HI 40 MIN: CPT

## 2021-01-28 ENCOUNTER — APPOINTMENT (OUTPATIENT)
Dept: INTERNAL MEDICINE | Facility: CLINIC | Age: 56
End: 2021-01-28
Payer: COMMERCIAL

## 2021-01-28 VITALS
DIASTOLIC BLOOD PRESSURE: 76 MMHG | WEIGHT: 264 LBS | OXYGEN SATURATION: 100 % | HEART RATE: 76 BPM | TEMPERATURE: 97.4 F | HEIGHT: 71 IN | BODY MASS INDEX: 36.96 KG/M2 | SYSTOLIC BLOOD PRESSURE: 128 MMHG

## 2021-01-28 DIAGNOSIS — Z23 ENCOUNTER FOR IMMUNIZATION: ICD-10-CM

## 2021-01-28 PROCEDURE — 36415 COLL VENOUS BLD VENIPUNCTURE: CPT

## 2021-01-28 PROCEDURE — 99396 PREV VISIT EST AGE 40-64: CPT | Mod: 25

## 2021-01-28 PROCEDURE — G0442 ANNUAL ALCOHOL SCREEN 15 MIN: CPT

## 2021-01-28 PROCEDURE — G0444 DEPRESSION SCREEN ANNUAL: CPT

## 2021-01-28 PROCEDURE — 99213 OFFICE O/P EST LOW 20 MIN: CPT | Mod: 25

## 2021-01-28 PROCEDURE — 99072 ADDL SUPL MATRL&STAF TM PHE: CPT

## 2021-01-28 RX ORDER — AMMONIUM LACTATE 12 %
12 CREAM (GRAM) TOPICAL TWICE DAILY
Qty: 1 | Refills: 3 | Status: ACTIVE | COMMUNITY
Start: 2021-01-28 | End: 1900-01-01

## 2021-01-28 RX ORDER — FOLIC ACID 1 MG/1
1 TABLET ORAL DAILY
Refills: 0 | Status: ACTIVE | COMMUNITY
Start: 2021-01-28

## 2021-02-01 NOTE — HISTORY OF PRESENT ILLNESS
[FreeTextEntry1] : Annual Physical [de-identified] : 56 y/o man, h/o Sleep Apnea, PreDiabetes, HTN, nonobstructive ASHD, Erectile Dysfunction, Hyperlipidemia.  \par He is followed closely by Cardiologist.\par He had his Colonoscopy in June: Cecal Polyps and Inflammatory changes noted.  He was started on Mesalamine.  Repeat Colonoscopy recommended in June this year.\par EGD: positive for Esophagitis: Omeprazole BID has helped his symptoms\par He was seen by Hematologist, Dr. Orozco for Erythrocytosis/Thrombocytopenia. Repeat Hgb/HCT, Platelet in the normal range.\par \par Tetanus 3 yrs go

## 2021-02-01 NOTE — PHYSICAL EXAM
[No Acute Distress] : no acute distress [Normal Sclera/Conjunctiva] : normal sclera/conjunctiva [EOMI] : extraocular movements intact [Normal Outer Ear/Nose] : the outer ears and nose were normal in appearance [Normal TMs] : both tympanic membranes were normal [No JVD] : no jugular venous distention [No Lymphadenopathy] : no lymphadenopathy [No Respiratory Distress] : no respiratory distress  [Clear to Auscultation] : lungs were clear to auscultation bilaterally [Normal Rate] : normal rate  [Regular Rhythm] : with a regular rhythm [Normal S1, S2] : normal S1 and S2 [No Murmur] : no murmur heard [No Carotid Bruits] : no carotid bruits [No Edema] : there was no peripheral edema [Soft] : abdomen soft [Non Tender] : non-tender [Normal Bowel Sounds] : normal bowel sounds [Normal Supraclavicular Nodes] : no supraclavicular lymphadenopathy [Normal Axillary Nodes] : no axillary lymphadenopathy [Normal Posterior Cervical Nodes] : no posterior cervical lymphadenopathy [Normal Anterior Cervical Nodes] : no anterior cervical lymphadenopathy [No CVA Tenderness] : no CVA  tenderness [No Spinal Tenderness] : no spinal tenderness [Normal] : affect was normal and insight and judgment were intact [de-identified] : has Hearing Aids [de-identified] : Venous Stasis changes [de-identified] : mild scoliosis [de-identified] : surgical scars on knees; Crepitus in knees.  Bunionectomy Scar right foot. Thickened toenails [de-identified] : dry skin on feet

## 2021-02-01 NOTE — REVIEW OF SYSTEMS
[Hearing Loss] : hearing loss [Fever] : no fever [Chills] : no chills [Discharge] : no discharge [Redness] : no redness [Earache] : no earache [Hoarseness] : no hoarseness [Chest Pain] : no chest pain [Palpitations] : no palpitations [Lower Ext Edema] : no lower extremity edema [Shortness Of Breath] : no shortness of breath [Cough] : no cough [Abdominal Pain] : no abdominal pain [Constipation] : no constipation [Diarrhea] : no diarrhea [Melena] : no melena [Dysuria] : no dysuria [Nocturia] : no nocturia [Joint Pain] : no joint pain [Skin Rash] : no skin rash [Headache] : no headache [Dizziness] : no dizziness [Suicidal] : not suicidal [Depression] : no depression [Easy Bleeding] : no easy bleeding [Swollen Glands] : no swollen glands [FreeTextEntry3] : last eye exam 11/20 [de-identified] : scaly feet

## 2021-02-01 NOTE — HEALTH RISK ASSESSMENT
[Very Good] : ~his/her~  mood as very good [0-5] : 0-5 [Yes] : Yes [2 - 4 times a month (2 pts)] : 2-4 times a month (2 points) [1 or 2 (0 pts)] : 1 or 2 (0 points) [No] : In the past 12 months have you used drugs other than those required for medical reasons? No [No falls in past year] : Patient reported no falls in the past year [0] : 2) Feeling down, depressed, or hopeless: Not at all (0) [None] : None [With Family] : lives with family [Employed] : employed [Graduate School] : graduate school [Significant Other] : lives with significant other [Sexually Active] : sexually active [Feels Safe at Home] : Feels safe at home [Fully functional (using the telephone, shopping, preparing meals, housekeeping, doing laundry, using] : Fully functional and needs no help or supervision to perform IADLs (using the telephone, shopping, preparing meals, housekeeping, doing laundry, using transportation, managing medications and managing finances) [Fully functional (bathing, dressing, toileting, transferring, walking, feeding)] : Fully functional (bathing, dressing, toileting, transferring, walking, feeding) [Reports changes in hearing] : Reports changes in hearing [Reports normal functional visual acuity (ie: able to read med bottle)] : Reports normal functional visual acuity [Patient reported colonoscopy was abnormal] : Patient reported colonoscopy was abnormal [HIV test declined] : HIV test declined [Hepatitis C test declined] : Hepatitis C test declined [] : No [Audit-CScore] : 2 [de-identified] : exercise 5x a week  [[id number]] : 0 [Change in mental status noted] : No change in mental status noted [Language] : denies difficulty with language [Behavior] : denies difficulty with behavior [Learning/Retaining New Information] : denies difficulty learning/retaining new information [Handling Complex Tasks] : denies difficulty handling complex tasks [Reasoning] : denies difficulty with reasoning [Spatial Ability and Orientation] : denies difficulty with spatial ability and orientation [Reports changes in vision] : Reports no changes in vision [Reports changes in dental health] : Reports no changes in dental health [ColonoscopyDate] : 06/20 [ColonoscopyComments] : Polyps/Inflammation.  Repeat 7/21 [de-identified] : doesn't have hearing aides on today

## 2021-02-22 ENCOUNTER — APPOINTMENT (OUTPATIENT)
Dept: PULMONOLOGY | Facility: CLINIC | Age: 56
End: 2021-02-22
Payer: COMMERCIAL

## 2021-02-22 VITALS
HEART RATE: 72 BPM | SYSTOLIC BLOOD PRESSURE: 144 MMHG | BODY MASS INDEX: 36.54 KG/M2 | HEIGHT: 71 IN | WEIGHT: 261 LBS | DIASTOLIC BLOOD PRESSURE: 80 MMHG | OXYGEN SATURATION: 98 % | TEMPERATURE: 96.8 F

## 2021-02-22 PROCEDURE — 99215 OFFICE O/P EST HI 40 MIN: CPT

## 2021-02-22 PROCEDURE — 99072 ADDL SUPL MATRL&STAF TM PHE: CPT

## 2021-02-22 NOTE — HISTORY OF PRESENT ILLNESS
[FreeTextEntry1] : He is doing well on his current APAP settings. Trying to exercise more and keep his weight down. No weight gain since his last visit. SOB is about the same.  [Obstructive Sleep Apnea] : obstructive sleep apnea [Snoring] : snoring [Witnessed Apneas] : witnessed sleep apnea [Frequent Nocturnal Awakening] : frequent nocturnal awakening [Daytime Somnolence] : daytime somnolence [ESS: ___] : ESS score [unfilled] [Difficulty Breathing During Exertion] : denies dyspnea on exertion [Feelings Of Weakness On Exertion] : denies exercise intolerance [Cough] : denies coughing [Wheezing] : denies wheezing [Regional Soft Tissue Swelling Both Lower Extremities] : denies lower extremity edema [Chest Pain Or Discomfort] : denies chest pain [Fever] : denies fever [Wt Loss ___ Lbs] : recent [unfilled] ~Upound(s) weight loss [Oxygen] : the patient uses no supplemental oxygen [2  -  Slight] : 2, slight [Class II - Mild Symptoms and Slight Limitations] : II [Never] : was never a smoker

## 2021-02-22 NOTE — REVIEW OF SYSTEMS
[Nasal Congestion] : nasal congestion [Postnasal Drip] : postnasal drip [Arthralgias] : arthralgias [Obesity] : obesity [Negative] : Endocrine

## 2021-02-22 NOTE — PROCEDURE
[FreeTextEntry1] : HST  4/14/2016 AHI- 69, lowest sat 54%\par \par APAP Compliance, Date: 01/22/2021 - 02/20/2021\par Usage: 30 days (100% Compliance)\par Usage >= 4 hours: 30 days (100%) \par Setting 6-20 cm H2O (95th percentile: 11.7, Maximum: 13.2)\par Average use: 5 hours 22 minutes\par Leaks - 95th percentile: 45.5 L/min, Max 60.8 L/min\par AHI: 3.1 \par \par APAP [X]  30 day [] 90 day compliance Dates:  [12/13=1/14/2020] \par Settings: [4-20] cm F[]  Andreina [] small [X]Med []  Lg . []  Nasal\par %compliance:[100]  \par Pressure:  95thile%  [10]cm max []cm\par Average use:  [6:00] \par Leak: [] L/ min,  max []L/ min   [] L/ hrs  []min\par AHI = [2] \par

## 2021-02-22 NOTE — HISTORY OF PRESENT ILLNESS
[Difficulty Breathing During Exertion] : denies dyspnea on exertion [Feelings Of Weakness On Exertion] : denies exercise intolerance [Cough] : denies coughing [Wheezing] : denies wheezing [Regional Soft Tissue Swelling Both Lower Extremities] : denies lower extremity edema [Chest Pain Or Discomfort] : denies chest pain [Fever] : denies fever [2  -  Slight] : 2, slight [Class II - Mild Symptoms and Slight Limitations] : II [Obstructive Sleep Apnea] : obstructive sleep apnea [Snoring] : snoring [Witnessed Apneas] : witnessed sleep apnea [Frequent Nocturnal Awakening] : frequent nocturnal awakening [Daytime Somnolence] : daytime somnolence [ESS: ___] : ESS score [unfilled] [Never] : never [TextBox_4] : Patient returns for follow up of his BENTON. He is doing well overall and has been compliant with his APAP. 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 He complains of ALEXANDER and people say that he breathes very loudly. He has gained about 10-15lbs since the beginning of the COVID pandemic but says he has lost about 6lbs and has begun doing martial arts. In the past, he notes that he used Afrin consistently for about 2-3 years. He has not been using nasal saline consistently, only using it about once a month.\par  [Oxygen] : the patient uses no supplemental oxygen

## 2021-02-22 NOTE — PHYSICAL EXAM
[No Acute Distress] : no acute distress [Low Lying Soft Palate] : low lying soft palate [Elongated Uvula] : elongated uvula [Enlarged Base of the Tongue] : enlarged base of the tongue [IV] : Mallampati Class: IV [Normal Appearance] : normal appearance [No Neck Mass] : no neck mass [Normal Rate/Rhythm] : normal rate/rhythm [Normal S1, S2] : normal s1, s2 [No Murmurs] : no murmurs [No Resp Distress] : no resp distress [Benign] : benign [Normal Gait] : normal gait [No Clubbing] : no clubbing [No Cyanosis] : no cyanosis [No Edema] : no edema [FROM] : FROM [Normal Color/ Pigmentation] : normal color/ pigmentation [No Focal Deficits] : no focal deficits [Oriented x3] : oriented x3 [Normal Affect] : normal affect [TextBox_11] : perforated nasal septum with dryness and crusting, crowded oropharynx [TextBox_68] : slightly prolonged expiratory phase without overt wheezing

## 2021-02-23 LAB
ALBUMIN SERPL ELPH-MCNC: 4.9 G/DL
ALP BLD-CCNC: 61 U/L
ALT SERPL-CCNC: 23 U/L
ANION GAP SERPL CALC-SCNC: 14 MMOL/L
APPEARANCE: CLEAR
AST SERPL-CCNC: 21 U/L
BACTERIA: NEGATIVE
BASOPHILS # BLD AUTO: 0.04 K/UL
BASOPHILS NFR BLD AUTO: 0.7 %
BILIRUB SERPL-MCNC: 0.4 MG/DL
BILIRUBIN URINE: NEGATIVE
BLOOD URINE: NEGATIVE
BUN SERPL-MCNC: 19 MG/DL
CALCIUM SERPL-MCNC: 9.7 MG/DL
CHLORIDE SERPL-SCNC: 103 MMOL/L
CHOLEST SERPL-MCNC: 91 MG/DL
CO2 SERPL-SCNC: 23 MMOL/L
COLOR: YELLOW
CREAT SERPL-MCNC: 0.93 MG/DL
CREAT SPEC-SCNC: 145 MG/DL
EOSINOPHIL # BLD AUTO: 0.12 K/UL
EOSINOPHIL NFR BLD AUTO: 2.1 %
ESTIMATED AVERAGE GLUCOSE: 120 MG/DL
GLUCOSE QUALITATIVE U: NEGATIVE
GLUCOSE SERPL-MCNC: 108 MG/DL
HBA1C MFR BLD HPLC: 5.8 %
HCT VFR BLD CALC: 42.9 %
HDLC SERPL-MCNC: 32 MG/DL
HGB BLD-MCNC: 14.5 G/DL
HYALINE CASTS: 1 /LPF
IMM GRANULOCYTES NFR BLD AUTO: 0.2 %
KETONES URINE: NEGATIVE
LDLC SERPL CALC-MCNC: 48 MG/DL
LEUKOCYTE ESTERASE URINE: NEGATIVE
LYMPHOCYTES # BLD AUTO: 1.48 K/UL
LYMPHOCYTES NFR BLD AUTO: 25.4 %
MAN DIFF?: NORMAL
MCHC RBC-ENTMCNC: 29.8 PG
MCHC RBC-ENTMCNC: 33.8 GM/DL
MCV RBC AUTO: 88.3 FL
MICROALBUMIN 24H UR DL<=1MG/L-MCNC: 2 MG/DL
MICROALBUMIN/CREAT 24H UR-RTO: 14 MG/G
MICROSCOPIC-UA: NORMAL
MONOCYTES # BLD AUTO: 0.43 K/UL
MONOCYTES NFR BLD AUTO: 7.4 %
NEUTROPHILS # BLD AUTO: 3.74 K/UL
NEUTROPHILS NFR BLD AUTO: 64.2 %
NITRITE URINE: NEGATIVE
NONHDLC SERPL-MCNC: 59 MG/DL
PH URINE: 6.5
PLATELET # BLD AUTO: 203 K/UL
POTASSIUM SERPL-SCNC: 4 MMOL/L
PROT SERPL-MCNC: 7.2 G/DL
PROTEIN URINE: NEGATIVE
PSA FREE FLD-MCNC: 22 %
PSA FREE SERPL-MCNC: 0.18 NG/ML
PSA SERPL-MCNC: 0.81 NG/ML
RBC # BLD: 4.86 M/UL
RBC # FLD: 12.3 %
RED BLOOD CELLS URINE: 1 /HPF
SODIUM SERPL-SCNC: 140 MMOL/L
SPECIFIC GRAVITY URINE: 1.02
SQUAMOUS EPITHELIAL CELLS: 0 /HPF
TRIGL SERPL-MCNC: 52 MG/DL
UROBILINOGEN URINE: NORMAL
WBC # FLD AUTO: 5.82 K/UL
WHITE BLOOD CELLS URINE: 0 /HPF

## 2021-03-15 ENCOUNTER — NON-APPOINTMENT (OUTPATIENT)
Age: 56
End: 2021-03-15

## 2021-03-29 ENCOUNTER — RX RENEWAL (OUTPATIENT)
Age: 56
End: 2021-03-29

## 2021-06-30 ENCOUNTER — RX RENEWAL (OUTPATIENT)
Age: 56
End: 2021-06-30

## 2021-06-30 RX ORDER — MESALAMINE 1.2 G/1
1.2 TABLET, DELAYED RELEASE ORAL
Qty: 60 | Refills: 5 | Status: ACTIVE | COMMUNITY
Start: 2020-06-23 | End: 1900-01-01

## 2021-07-06 ENCOUNTER — APPOINTMENT (OUTPATIENT)
Dept: CARDIOLOGY | Facility: CLINIC | Age: 56
End: 2021-07-06

## 2021-08-18 ENCOUNTER — APPOINTMENT (OUTPATIENT)
Dept: CARDIOLOGY | Facility: CLINIC | Age: 56
End: 2021-08-18

## 2021-09-13 ENCOUNTER — APPOINTMENT (OUTPATIENT)
Dept: CARDIOLOGY | Facility: CLINIC | Age: 56
End: 2021-09-13
Payer: COMMERCIAL

## 2021-09-13 ENCOUNTER — NON-APPOINTMENT (OUTPATIENT)
Age: 56
End: 2021-09-13

## 2021-09-13 VITALS
DIASTOLIC BLOOD PRESSURE: 90 MMHG | BODY MASS INDEX: 38.08 KG/M2 | HEART RATE: 55 BPM | OXYGEN SATURATION: 98 % | HEIGHT: 71 IN | WEIGHT: 272 LBS | SYSTOLIC BLOOD PRESSURE: 130 MMHG

## 2021-09-13 PROCEDURE — 93000 ELECTROCARDIOGRAM COMPLETE: CPT

## 2021-09-13 PROCEDURE — 99214 OFFICE O/P EST MOD 30 MIN: CPT

## 2021-11-17 ENCOUNTER — APPOINTMENT (OUTPATIENT)
Dept: INTERNAL MEDICINE | Facility: CLINIC | Age: 56
End: 2021-11-17
Payer: COMMERCIAL

## 2021-11-17 VITALS
HEART RATE: 65 BPM | TEMPERATURE: 97.7 F | OXYGEN SATURATION: 99 % | SYSTOLIC BLOOD PRESSURE: 120 MMHG | HEIGHT: 71 IN | WEIGHT: 262 LBS | BODY MASS INDEX: 36.68 KG/M2 | DIASTOLIC BLOOD PRESSURE: 80 MMHG

## 2021-11-17 DIAGNOSIS — R42 DIZZINESS AND GIDDINESS: ICD-10-CM

## 2021-11-17 PROCEDURE — 36415 COLL VENOUS BLD VENIPUNCTURE: CPT

## 2021-11-17 PROCEDURE — 99214 OFFICE O/P EST MOD 30 MIN: CPT | Mod: 25

## 2021-11-22 LAB
ALBUMIN SERPL ELPH-MCNC: 5.1 G/DL
ALP BLD-CCNC: 66 U/L
ALT SERPL-CCNC: 29 U/L
ANION GAP SERPL CALC-SCNC: 15 MMOL/L
AST SERPL-CCNC: 25 U/L
BILIRUB SERPL-MCNC: 0.6 MG/DL
BUN SERPL-MCNC: 19 MG/DL
CALCIUM SERPL-MCNC: 9.6 MG/DL
CHLORIDE SERPL-SCNC: 101 MMOL/L
CO2 SERPL-SCNC: 24 MMOL/L
CREAT SERPL-MCNC: 0.93 MG/DL
ESTIMATED AVERAGE GLUCOSE: 120 MG/DL
GLUCOSE SERPL-MCNC: 97 MG/DL
HBA1C MFR BLD HPLC: 5.8 %
POTASSIUM SERPL-SCNC: 3.9 MMOL/L
PROT SERPL-MCNC: 7.4 G/DL
SODIUM SERPL-SCNC: 140 MMOL/L

## 2021-11-26 RX ORDER — B-COMPLEX WITH VITAMIN C
TABLET ORAL DAILY
Qty: 90 | Refills: 3 | Status: ACTIVE | COMMUNITY
Start: 1900-01-01 | End: 1900-01-01

## 2021-11-27 PROBLEM — R42 VERTIGO: Status: ACTIVE | Noted: 2021-11-17

## 2021-11-27 NOTE — REVIEW OF SYSTEMS
[Dizziness] : dizziness [Fever] : no fever [Fatigue] : no fatigue [Pain] : no pain [Redness] : no redness [Sore Throat] : no sore throat [Chest Pain] : no chest pain [Paroxysmal Nocturnal Dyspnea] : no paroxysmal nocturnal dyspnea [Shortness Of Breath] : no shortness of breath [Cough] : no cough [de-identified] : he reports episodic dizziness; exacerbated by rapid head movement [FreeTextEntry1] : Endocrine: no increased thirst, polyuria, polydipsia

## 2021-11-27 NOTE — PHYSICAL EXAM
[No Acute Distress] : no acute distress [Well-Appearing] : well-appearing [Normal Outer Ear/Nose] : the outer ears and nose were normal in appearance [Normal TMs] : both tympanic membranes were normal [No JVD] : no jugular venous distention [No Lymphadenopathy] : no lymphadenopathy [No Respiratory Distress] : no respiratory distress  [Clear to Auscultation] : lungs were clear to auscultation bilaterally [Normal Rate] : normal rate  [Regular Rhythm] : with a regular rhythm [Normal S1, S2] : normal S1 and S2 [No Murmur] : no murmur heard [Pedal Pulses Present] : the pedal pulses are present [No Edema] : there was no peripheral edema [Soft] : abdomen soft [Non Tender] : non-tender [Normal Bowel Sounds] : normal bowel sounds

## 2021-11-27 NOTE — HISTORY OF PRESENT ILLNESS
[FreeTextEntry1] : follow up [de-identified] : Patient is a 55 y/o man here for a follow up.  He has a h/o Prediabetes, HTN, Hyperlipidemia, Sleep Apnea, nonobstructive ASHD (50% RCA).\par He wants to check his Kidney function l and AIC. He has been following a low starch and fat diet.\par He is c/o dizziness; he symptoms come on with rapid head movement.\par He had Pfizer COVID vaccine\par

## 2021-12-06 ENCOUNTER — NON-APPOINTMENT (OUTPATIENT)
Age: 56
End: 2021-12-06

## 2022-01-04 ENCOUNTER — APPOINTMENT (OUTPATIENT)
Dept: FAMILY MEDICINE | Facility: CLINIC | Age: 57
End: 2022-01-04
Payer: COMMERCIAL

## 2022-01-04 VITALS
OXYGEN SATURATION: 99 % | BODY MASS INDEX: 36.68 KG/M2 | RESPIRATION RATE: 16 BRPM | HEART RATE: 96 BPM | TEMPERATURE: 98.2 F | DIASTOLIC BLOOD PRESSURE: 92 MMHG | SYSTOLIC BLOOD PRESSURE: 142 MMHG | WEIGHT: 262 LBS | HEIGHT: 71 IN

## 2022-01-04 DIAGNOSIS — M79.89 OTHER SPECIFIED SOFT TISSUE DISORDERS: ICD-10-CM

## 2022-01-04 PROCEDURE — 99213 OFFICE O/P EST LOW 20 MIN: CPT

## 2022-01-04 RX ORDER — NAPROXEN 500 MG/1
500 TABLET ORAL
Qty: 14 | Refills: 0 | Status: COMPLETED | COMMUNITY
Start: 2022-01-04 | End: 2022-01-11

## 2022-01-09 ENCOUNTER — RESULT REVIEW (OUTPATIENT)
Age: 57
End: 2022-01-09

## 2022-01-10 ENCOUNTER — RESULT REVIEW (OUTPATIENT)
Age: 57
End: 2022-01-10

## 2022-01-11 ENCOUNTER — NON-APPOINTMENT (OUTPATIENT)
Age: 57
End: 2022-01-11

## 2022-01-31 ENCOUNTER — RESULT REVIEW (OUTPATIENT)
Age: 57
End: 2022-01-31

## 2022-01-31 ENCOUNTER — NON-APPOINTMENT (OUTPATIENT)
Age: 57
End: 2022-01-31

## 2022-01-31 ENCOUNTER — APPOINTMENT (OUTPATIENT)
Dept: INTERNAL MEDICINE | Facility: CLINIC | Age: 57
End: 2022-01-31
Payer: COMMERCIAL

## 2022-01-31 VITALS
BODY MASS INDEX: 37.93 KG/M2 | SYSTOLIC BLOOD PRESSURE: 142 MMHG | WEIGHT: 262 LBS | HEIGHT: 69.5 IN | DIASTOLIC BLOOD PRESSURE: 82 MMHG | OXYGEN SATURATION: 98 % | TEMPERATURE: 97.5 F | RESPIRATION RATE: 17 BRPM | HEART RATE: 80 BPM

## 2022-01-31 DIAGNOSIS — Z11.9 ENCOUNTER FOR SCREENING FOR INFECTIOUS AND PARASITIC DISEASES, UNSPECIFIED: ICD-10-CM

## 2022-01-31 DIAGNOSIS — U07.1 COVID-19: ICD-10-CM

## 2022-01-31 DIAGNOSIS — L03.116 CELLULITIS OF LEFT LOWER LIMB: ICD-10-CM

## 2022-01-31 PROCEDURE — G0442 ANNUAL ALCOHOL SCREEN 15 MIN: CPT

## 2022-01-31 PROCEDURE — 93000 ELECTROCARDIOGRAM COMPLETE: CPT | Mod: 59

## 2022-01-31 PROCEDURE — 99214 OFFICE O/P EST MOD 30 MIN: CPT | Mod: 25

## 2022-01-31 PROCEDURE — 99396 PREV VISIT EST AGE 40-64: CPT | Mod: 25

## 2022-01-31 PROCEDURE — 36415 COLL VENOUS BLD VENIPUNCTURE: CPT

## 2022-02-01 PROBLEM — U07.1 COVID-19 VIRUS INFECTION: Status: ACTIVE | Noted: 2022-01-31

## 2022-02-01 PROBLEM — L03.116 CELLULITIS OF LEFT LOWER LEG: Status: ACTIVE | Noted: 2022-01-31

## 2022-02-01 PROBLEM — Z11.9 SCREENING EXAMINATION FOR INFECTIOUS DISEASE: Status: ACTIVE | Noted: 2022-01-31

## 2022-02-01 NOTE — HEALTH RISK ASSESSMENT
[Good] : ~his/her~  mood as  good [Never] : Never [Yes] : Yes [Monthly or less (1 pt)] : Monthly or less (1 point) [1 or 2 (0 pts)] : 1 or 2 (0 points) [Never (0 pts)] : Never (0 points) [0] : 1) Little interest or pleasure doing things: Not at all (0) [1] : 2) Feeling down, depressed, or hopeless for several days (1) [Patient reported colonoscopy was abnormal] : Patient reported colonoscopy was abnormal [HIV test declined] : HIV test declined [Hepatitis C test declined] : Hepatitis C test declined [FreeTextEntry1] : ''I am difficulty breathing'' [de-identified] : ''Once a month'' [Audit-CScore] : 1 [de-identified] : ''Yes I fell once and injured my leg, it's all better'' [[id number]] : 1 [LowDoseCTScan] : N/A [EyeExamDate] : 12/2021 [MammogramDate] : N/A [PapSmearDate] : N/A [BoneDensityDate] : N/A [ColonoscopyDate] : 2020 [ColonoscopyComments] : ''The doctor found little nodules''

## 2022-02-16 ENCOUNTER — APPOINTMENT (OUTPATIENT)
Dept: CARDIOLOGY | Facility: CLINIC | Age: 57
End: 2022-02-16
Payer: COMMERCIAL

## 2022-02-16 VITALS
SYSTOLIC BLOOD PRESSURE: 120 MMHG | HEIGHT: 69.5 IN | DIASTOLIC BLOOD PRESSURE: 80 MMHG | BODY MASS INDEX: 38.37 KG/M2 | HEART RATE: 75 BPM | TEMPERATURE: 97.9 F | WEIGHT: 265 LBS | OXYGEN SATURATION: 98 %

## 2022-02-16 DIAGNOSIS — R60.0 LOCALIZED EDEMA: ICD-10-CM

## 2022-02-16 LAB
ALBUMIN SERPL ELPH-MCNC: 5 G/DL
ALP BLD-CCNC: 60 U/L
ALT SERPL-CCNC: 23 U/L
ANION GAP SERPL CALC-SCNC: 14 MMOL/L
APPEARANCE: CLEAR
AST SERPL-CCNC: 27 U/L
BACTERIA: NEGATIVE
BASOPHILS # BLD AUTO: 0.04 K/UL
BASOPHILS NFR BLD AUTO: 0.6 %
BILIRUB SERPL-MCNC: 0.6 MG/DL
BILIRUBIN URINE: NEGATIVE
BLOOD URINE: NEGATIVE
BUN SERPL-MCNC: 19 MG/DL
CALCIUM SERPL-MCNC: 9.5 MG/DL
CHLORIDE SERPL-SCNC: 104 MMOL/L
CHOLEST SERPL-MCNC: 106 MG/DL
CO2 SERPL-SCNC: 24 MMOL/L
COLOR: YELLOW
CREAT SERPL-MCNC: 0.91 MG/DL
EOSINOPHIL # BLD AUTO: 0 K/UL
EOSINOPHIL NFR BLD AUTO: 0 %
ESTIMATED AVERAGE GLUCOSE: 114 MG/DL
GLUCOSE QUALITATIVE U: NEGATIVE
GLUCOSE SERPL-MCNC: 105 MG/DL
HBA1C MFR BLD HPLC: 5.6 %
HCT VFR BLD CALC: 42.3 %
HCV AB SER QL: NONREACTIVE
HCV S/CO RATIO: 0.11 S/CO
HDLC SERPL-MCNC: 37 MG/DL
HGB BLD-MCNC: 14 G/DL
HIV1+2 AB SPEC QL IA.RAPID: NONREACTIVE
HYALINE CASTS: 0 /LPF
IMM GRANULOCYTES NFR BLD AUTO: 0.2 %
KETONES URINE: NEGATIVE
LDLC SERPL CALC-MCNC: 58 MG/DL
LEUKOCYTE ESTERASE URINE: NEGATIVE
LYMPHOCYTES # BLD AUTO: 1.75 K/UL
LYMPHOCYTES NFR BLD AUTO: 27.4 %
MAN DIFF?: NORMAL
MCHC RBC-ENTMCNC: 29.5 PG
MCHC RBC-ENTMCNC: 33.1 GM/DL
MCV RBC AUTO: 89.1 FL
MICROSCOPIC-UA: NORMAL
MONOCYTES # BLD AUTO: 0.44 K/UL
MONOCYTES NFR BLD AUTO: 6.9 %
NEUTROPHILS # BLD AUTO: 4.14 K/UL
NEUTROPHILS NFR BLD AUTO: 64.9 %
NITRITE URINE: NEGATIVE
NONHDLC SERPL-MCNC: 69 MG/DL
PH URINE: 6.5
PLATELET # BLD AUTO: 235 K/UL
POTASSIUM SERPL-SCNC: 4 MMOL/L
PROT SERPL-MCNC: 7.3 G/DL
PROTEIN URINE: NORMAL
PSA FREE FLD-MCNC: 34 %
PSA FREE SERPL-MCNC: 0.17 NG/ML
PSA SERPL-MCNC: 0.49 NG/ML
RBC # BLD: 4.75 M/UL
RBC # FLD: 12.9 %
RED BLOOD CELLS URINE: 3 /HPF
SODIUM SERPL-SCNC: 141 MMOL/L
SPECIFIC GRAVITY URINE: 1.02
SQUAMOUS EPITHELIAL CELLS: 0 /HPF
TRIGL SERPL-MCNC: 56 MG/DL
TSH SERPL-ACNC: 3.27 UIU/ML
UROBILINOGEN URINE: NORMAL
WBC # FLD AUTO: 6.38 K/UL
WHITE BLOOD CELLS URINE: 1 /HPF

## 2022-02-16 PROCEDURE — 99214 OFFICE O/P EST MOD 30 MIN: CPT

## 2022-02-23 ENCOUNTER — APPOINTMENT (OUTPATIENT)
Dept: GASTROENTEROLOGY | Facility: HOSPITAL | Age: 57
End: 2022-02-23

## 2022-02-25 ENCOUNTER — RX RENEWAL (OUTPATIENT)
Age: 57
End: 2022-02-25

## 2022-03-02 ENCOUNTER — APPOINTMENT (OUTPATIENT)
Dept: VASCULAR SURGERY | Facility: CLINIC | Age: 57
End: 2022-03-02
Payer: COMMERCIAL

## 2022-03-02 VITALS — DIASTOLIC BLOOD PRESSURE: 89 MMHG | SYSTOLIC BLOOD PRESSURE: 136 MMHG | HEART RATE: 89 BPM

## 2022-03-02 DIAGNOSIS — I87.8 OTHER SPECIFIED DISORDERS OF VEINS: ICD-10-CM

## 2022-03-02 PROCEDURE — 93970 EXTREMITY STUDY: CPT

## 2022-03-02 PROCEDURE — 99243 OFF/OP CNSLTJ NEW/EST LOW 30: CPT

## 2022-03-02 RX ORDER — DOXYCYCLINE HYCLATE 100 MG/1
100 TABLET ORAL
Qty: 14 | Refills: 0 | Status: DISCONTINUED | COMMUNITY
Start: 2022-01-31 | End: 2022-03-02

## 2022-03-02 RX ORDER — ZOSTER VACCINE RECOMBINANT, ADJUVANTED 50 MCG/0.5
50 KIT INTRAMUSCULAR
Qty: 1 | Refills: 1 | Status: DISCONTINUED | COMMUNITY
Start: 2021-01-28 | End: 2022-03-02

## 2022-03-02 NOTE — HISTORY OF PRESENT ILLNESS
[FreeTextEntry1] : 55 yo male referred by  on 4 lateral lower extremity skin discoloration and a slow healing ulcer on the left leg. The patient reports that he cut his leg several months ago. He reports that the cut has been slow to heal. He reports a long history of skin discoloration. He reports that he had severe pain edema for approximately one year which resolved with an 80 pound weight loss. He denies history of DVT or SVT. He does not currently wear compression stockings.

## 2022-03-02 NOTE — ASSESSMENT
[FreeTextEntry1] : 57 yo male with stasis changes bl and a slow healing ulceration of his left leg. He has normal pulses on physical exam. He has stasis changes bilaterally and mild edema. He underwent a venous duplex which did not demonstrate significant venous insufficiency. I recommended that he was restless legs daily. He was instructed to wear compression sleeves when exercising. He will followup with me on a p.r.n. basis.

## 2022-03-02 NOTE — PHYSICAL EXAM
[Normal Breath Sounds] : Normal breath sounds [Normal Rate and Rhythm] : normal rate and rhythm [2+] : left 2+ [Ankle Swelling (On Exam)] : present [Ankle Swelling On The Right] : mild [] : bilaterally [Ankle Swelling Bilaterally] : severe [Alert] : alert [Oriented to Person] : oriented to person [Oriented to Place] : oriented to place [Oriented to Time] : oriented to time [JVD] : no jugular venous distention  [Varicose Veins Of Lower Extremities] : not present [de-identified] : Awake and Alert [de-identified] : stasis changes bl, scab consistent with trauma left medial calf [de-identified] : No gross motor or sensory deficits [de-identified] : Appropriate

## 2022-03-02 NOTE — REVIEW OF SYSTEMS
[Lower Ext Edema] : lower extremity edema [As Noted in HPI] : as noted in HPI [Fever] : no fever [Chills] : no chills [Leg Claudication] : no intermittent leg claudication

## 2022-03-02 NOTE — REASON FOR VISIT
[Initial Evaluation] : an initial evaluation [FreeTextEntry1] : Skin discoloration bl, nonhealing ulcer left leg

## 2022-03-07 ENCOUNTER — RESULT REVIEW (OUTPATIENT)
Age: 57
End: 2022-03-07

## 2022-06-15 ENCOUNTER — RX RENEWAL (OUTPATIENT)
Age: 57
End: 2022-06-15

## 2022-06-16 ENCOUNTER — RX RENEWAL (OUTPATIENT)
Age: 57
End: 2022-06-16

## 2022-08-16 ENCOUNTER — APPOINTMENT (OUTPATIENT)
Dept: INTERNAL MEDICINE | Facility: CLINIC | Age: 57
End: 2022-08-16

## 2022-08-24 ENCOUNTER — APPOINTMENT (OUTPATIENT)
Dept: CARDIOLOGY | Facility: CLINIC | Age: 57
End: 2022-08-24

## 2022-08-29 ENCOUNTER — RX RENEWAL (OUTPATIENT)
Age: 57
End: 2022-08-29

## 2022-09-08 ENCOUNTER — APPOINTMENT (OUTPATIENT)
Dept: INTERNAL MEDICINE | Facility: CLINIC | Age: 57
End: 2022-09-08

## 2022-09-08 VITALS
SYSTOLIC BLOOD PRESSURE: 138 MMHG | WEIGHT: 268 LBS | DIASTOLIC BLOOD PRESSURE: 80 MMHG | RESPIRATION RATE: 16 BRPM | HEIGHT: 69.5 IN | OXYGEN SATURATION: 98 % | HEART RATE: 86 BPM | TEMPERATURE: 98.6 F | BODY MASS INDEX: 38.8 KG/M2

## 2022-09-08 PROCEDURE — 99213 OFFICE O/P EST LOW 20 MIN: CPT

## 2022-09-16 ENCOUNTER — RESULT REVIEW (OUTPATIENT)
Age: 57
End: 2022-09-16

## 2022-09-18 ENCOUNTER — RESULT REVIEW (OUTPATIENT)
Age: 57
End: 2022-09-18

## 2022-09-19 ENCOUNTER — APPOINTMENT (OUTPATIENT)
Dept: GASTROENTEROLOGY | Facility: HOSPITAL | Age: 57
End: 2022-09-19

## 2022-09-19 ENCOUNTER — RESULT REVIEW (OUTPATIENT)
Age: 57
End: 2022-09-19

## 2022-09-28 ENCOUNTER — APPOINTMENT (OUTPATIENT)
Dept: CARDIOLOGY | Facility: CLINIC | Age: 57
End: 2022-09-28

## 2022-10-13 ENCOUNTER — APPOINTMENT (OUTPATIENT)
Dept: INTERNAL MEDICINE | Facility: CLINIC | Age: 57
End: 2022-10-13

## 2022-10-13 VITALS
BODY MASS INDEX: 39.69 KG/M2 | OXYGEN SATURATION: 98 % | HEART RATE: 79 BPM | HEIGHT: 69 IN | DIASTOLIC BLOOD PRESSURE: 80 MMHG | WEIGHT: 268 LBS | TEMPERATURE: 97.1 F | SYSTOLIC BLOOD PRESSURE: 124 MMHG

## 2022-10-13 DIAGNOSIS — L85.3 XEROSIS CUTIS: ICD-10-CM

## 2022-10-13 DIAGNOSIS — N63.20 UNSPECIFIED LUMP IN THE LEFT BREAST, UNSPECIFIED QUADRANT: ICD-10-CM

## 2022-10-13 DIAGNOSIS — I87.2 VENOUS INSUFFICIENCY (CHRONIC) (PERIPHERAL): ICD-10-CM

## 2022-10-13 PROCEDURE — 99214 OFFICE O/P EST MOD 30 MIN: CPT

## 2022-10-13 RX ORDER — HYDROCORTISONE 1 %
12 CREAM (GRAM) TOPICAL
Qty: 1 | Refills: 3 | Status: ACTIVE | COMMUNITY
Start: 2022-10-13 | End: 1900-01-01

## 2022-10-19 ENCOUNTER — APPOINTMENT (OUTPATIENT)
Dept: VASCULAR SURGERY | Facility: CLINIC | Age: 57
End: 2022-10-19

## 2022-10-19 VITALS — DIASTOLIC BLOOD PRESSURE: 87 MMHG | SYSTOLIC BLOOD PRESSURE: 134 MMHG | HEART RATE: 85 BPM

## 2022-10-19 DIAGNOSIS — I87.2 VENOUS INSUFFICIENCY (CHRONIC) (PERIPHERAL): ICD-10-CM

## 2022-10-19 PROCEDURE — 99213 OFFICE O/P EST LOW 20 MIN: CPT

## 2022-10-19 PROCEDURE — 93971 EXTREMITY STUDY: CPT

## 2022-10-19 NOTE — PHYSICAL EXAM
[Normal Breath Sounds] : Normal breath sounds [Normal Rate and Rhythm] : normal rate and rhythm [2+] : left 2+ [Ankle Swelling (On Exam)] : present [Ankle Swelling On The Right] : mild [] : bilaterally [Ankle Swelling Bilaterally] : severe [Alert] : alert [Oriented to Person] : oriented to person [Oriented to Place] : oriented to place [Oriented to Time] : oriented to time [JVD] : no jugular venous distention  [Varicose Veins Of Lower Extremities] : not present [de-identified] : Awake and Alert [de-identified] : stasis changes bl, 1 X 2 granulating ulceration right lower leg [de-identified] : No gross motor or sensory deficits [de-identified] : Appropriate

## 2022-10-19 NOTE — HISTORY OF PRESENT ILLNESS
[FreeTextEntry1] : 55 yo male referred by  on 4 lateral lower extremity skin discoloration and a slow healing ulcer on the left leg. The patient reports that he cut his leg several months ago. He reports that the cut has been slow to heal. He reports a long history of skin discoloration. He reports that he had severe pain edema for approximately one year which resolved with an 80 pound weight loss. He denies history of DVT or SVT. He does not currently wear compression stockings. [de-identified] : 57-year-old male previously seen for venous insufficiency returns with a new right lower extremity venous ulceration.  The patient reports that he developed an ulcer approximately 6 weeks ago.  He was seen by Dr. Gaspar and started on a 10 day course of Doxycycline.  He has been applying Silvadene to the wound.  He denies fever or chills.  He does not wear compression stockings.  He denies a history of deep or superficial venous thrombosis.

## 2022-10-19 NOTE — ASSESSMENT
[FreeTextEntry1] : 56 yo male with stasis changes bl and a  ulceration of his right leg. He has normal pulses on physical exam. He has stasis changes bilaterally and mild edema. He underwent a venous duplex which demonstrated no venous insufficiency and a small GSV.  I recommended that he complete the course of antibiotics. He was instructed to apply Silvadene to the wound. He was instructed to wear compression sleeves daily. \par \par He will followup with me in several weeks to check on the efficacy of the treatment.

## 2022-10-24 ENCOUNTER — APPOINTMENT (OUTPATIENT)
Dept: CARDIOLOGY | Facility: CLINIC | Age: 57
End: 2022-10-24

## 2022-10-24 ENCOUNTER — NON-APPOINTMENT (OUTPATIENT)
Age: 57
End: 2022-10-24

## 2022-10-24 ENCOUNTER — RX CHANGE (OUTPATIENT)
Age: 57
End: 2022-10-24

## 2022-10-24 VITALS
SYSTOLIC BLOOD PRESSURE: 140 MMHG | HEART RATE: 80 BPM | DIASTOLIC BLOOD PRESSURE: 80 MMHG | OXYGEN SATURATION: 98 % | HEIGHT: 69 IN | TEMPERATURE: 97.6 F | WEIGHT: 273 LBS | BODY MASS INDEX: 40.43 KG/M2

## 2022-10-24 VITALS — SYSTOLIC BLOOD PRESSURE: 132 MMHG | DIASTOLIC BLOOD PRESSURE: 80 MMHG

## 2022-10-24 PROCEDURE — 93000 ELECTROCARDIOGRAM COMPLETE: CPT

## 2022-10-24 PROCEDURE — 99214 OFFICE O/P EST MOD 30 MIN: CPT | Mod: 25

## 2022-10-24 RX ORDER — LOSARTAN POTASSIUM 100 MG/1
100 TABLET, FILM COATED ORAL DAILY
Qty: 30 | Refills: 3 | Status: DISCONTINUED | COMMUNITY
Start: 2022-06-15 | End: 2022-10-24

## 2022-10-25 NOTE — REVIEW OF SYSTEMS
[Fever] : no fever [Chills] : no chills [Chest Pain] : no chest pain [Lower Ext Edema] : lower extremity edema [Shortness Of Breath] : no shortness of breath [Abdominal Pain] : no abdominal pain [Headache] : no headache [Swollen Glands] : no swollen glands [de-identified] : Ulcer on right leg

## 2022-10-25 NOTE — PHYSICAL EXAM
[No Acute Distress] : no acute distress [No JVD] : no jugular venous distention Clothing [No Respiratory Distress] : no respiratory distress  [Clear to Auscultation] : lungs were clear to auscultation bilaterally [Normal Rate] : normal rate  [Regular Rhythm] : with a regular rhythm [Normal S1, S2] : normal S1 and S2 [No Murmur] : no murmur heard [No Carotid Bruits] : no carotid bruits [Pedal Pulses Present] : the pedal pulses are present [Normal] : normal gait, coordination grossly intact, no focal deficits and deep tendon reflexes were 2+ and symmetric [de-identified] : Venous Stasis changes; there is 1.5 x 2 cm ulcer on right anterior leg [de-identified] : bilateral lower ext edema  [de-identified] : Feet: dry skin on Feet 1 watch/Jewelry/Money (specify)/Clothing/Cell Phone/PDA (specify)

## 2022-10-25 NOTE — HISTORY OF PRESENT ILLNESS
[FreeTextEntry1] : Ulcer on right leg [de-identified] : Patient is a 58 y/o man h/o Venous Insufficiency who comes in with ulceration on his right lower leg.\par He has had the Lesion for about 6 weeks.  He participates in Kick Boxing and admits to trauma to his legs.\par No fever or chills.\par He was seen by Vascular Surgeon in the past and Compression Stockings were recommended.  He has not been wearing them.\par \par Left breast lesion on Mammogram and US: mixed fatty and fibroglandular tissue

## 2022-10-30 PROBLEM — I87.2 VENOUS INSUFFICIENCY OF RIGHT LOWER EXTREMITY: Status: ACTIVE | Noted: 2022-10-19

## 2022-11-28 ENCOUNTER — APPOINTMENT (OUTPATIENT)
Dept: PULMONOLOGY | Facility: CLINIC | Age: 57
End: 2022-11-28

## 2022-11-28 ENCOUNTER — NON-APPOINTMENT (OUTPATIENT)
Age: 57
End: 2022-11-28

## 2022-11-28 ENCOUNTER — LABORATORY RESULT (OUTPATIENT)
Age: 57
End: 2022-11-28

## 2022-11-28 VITALS
OXYGEN SATURATION: 98 % | HEART RATE: 73 BPM | TEMPERATURE: 97.3 F | DIASTOLIC BLOOD PRESSURE: 92 MMHG | BODY MASS INDEX: 40.88 KG/M2 | HEIGHT: 69 IN | SYSTOLIC BLOOD PRESSURE: 164 MMHG | WEIGHT: 276 LBS

## 2022-11-28 DIAGNOSIS — J30.89 OTHER ALLERGIC RHINITIS: ICD-10-CM

## 2022-11-28 DIAGNOSIS — E66.01 MORBID (SEVERE) OBESITY DUE TO EXCESS CALORIES: ICD-10-CM

## 2022-11-28 DIAGNOSIS — J45.30 MILD PERSISTENT ASTHMA, UNCOMPLICATED: ICD-10-CM

## 2022-11-28 DIAGNOSIS — E66.9 OBESITY, UNSPECIFIED: ICD-10-CM

## 2022-11-28 DIAGNOSIS — J98.8 OTHER SPECIFIED RESPIRATORY DISORDERS: ICD-10-CM

## 2022-11-28 DIAGNOSIS — F51.12 INSUFFICIENT SLEEP SYNDROME: ICD-10-CM

## 2022-11-28 PROCEDURE — 99214 OFFICE O/P EST MOD 30 MIN: CPT

## 2022-11-29 ENCOUNTER — APPOINTMENT (OUTPATIENT)
Dept: INTERNAL MEDICINE | Facility: CLINIC | Age: 57
End: 2022-11-29

## 2022-11-29 VITALS — DIASTOLIC BLOOD PRESSURE: 96 MMHG | SYSTOLIC BLOOD PRESSURE: 143 MMHG

## 2022-11-30 LAB
A ALTERNATA IGE QN: <0.1 KUA/L
A FUMIGATUS IGE QN: <0.1 KUA/L
ALBUMIN SERPL ELPH-MCNC: 4.8 G/DL
ALP BLD-CCNC: 58 U/L
ALT SERPL-CCNC: 32 U/L
ANION GAP SERPL CALC-SCNC: 11 MMOL/L
ANION GAP SERPL CALC-SCNC: 15 MMOL/L
AST SERPL-CCNC: 23 U/L
BASOPHILS # BLD AUTO: 0.03 K/UL
BASOPHILS NFR BLD AUTO: 0.6 %
BERMUDA GRASS IGE QN: <0.1 KUA/L
BILIRUB SERPL-MCNC: 0.3 MG/DL
BOXELDER IGE QN: <0.1 KUA/L
BUN SERPL-MCNC: 20 MG/DL
BUN SERPL-MCNC: 21 MG/DL
C HERBARUM IGE QN: <0.1 KUA/L
CALCIUM SERPL-MCNC: 9.8 MG/DL
CALCIUM SERPL-MCNC: 9.9 MG/DL
CALIF WALNUT IGE QN: <0.1 KUA/L
CAT DANDER IGE QN: <0.1 KUA/L
CHLORIDE SERPL-SCNC: 101 MMOL/L
CHLORIDE SERPL-SCNC: 99 MMOL/L
CMN PIGWEED IGE QN: <0.1 KUA/L
CO2 SERPL-SCNC: 24 MMOL/L
CO2 SERPL-SCNC: 27 MMOL/L
COMMON RAGWEED IGE QN: <0.1 KUA/L
COTTONWOOD IGE QN: <0.1 KUA/L
CREAT SERPL-MCNC: 0.9 MG/DL
CREAT SERPL-MCNC: 0.98 MG/DL
D FARINAE IGE QN: 1.4 KUA/L
D PTERONYSS IGE QN: 1.16 KUA/L
DEPRECATED A ALTERNATA IGE RAST QL: 0
DEPRECATED A FUMIGATUS IGE RAST QL: 0
DEPRECATED BERMUDA GRASS IGE RAST QL: 0
DEPRECATED BOXELDER IGE RAST QL: 0
DEPRECATED C HERBARUM IGE RAST QL: 0
DEPRECATED CAT DANDER IGE RAST QL: 0
DEPRECATED COMMON PIGWEED IGE RAST QL: 0
DEPRECATED COMMON RAGWEED IGE RAST QL: 0
DEPRECATED COTTONWOOD IGE RAST QL: 0
DEPRECATED D FARINAE IGE RAST QL: 2
DEPRECATED D PTERONYSS IGE RAST QL: 2
DEPRECATED DOG DANDER IGE RAST QL: 0
DEPRECATED GOOSEFOOT IGE RAST QL: 0
DEPRECATED LONDON PLANE IGE RAST QL: 0
DEPRECATED MOUSE URINE PROT IGE RAST QL: 0
DEPRECATED MUGWORT IGE RAST QL: 0
DEPRECATED P NOTATUM IGE RAST QL: 0
DEPRECATED RED CEDAR IGE RAST QL: 0
DEPRECATED ROACH IGE RAST QL: 0
DEPRECATED SHEEP SORREL IGE RAST QL: 0
DEPRECATED SILVER BIRCH IGE RAST QL: 0
DEPRECATED TIMOTHY IGE RAST QL: 0
DEPRECATED WHITE ASH IGE RAST QL: 0
DEPRECATED WHITE OAK IGE RAST QL: 0
DOG DANDER IGE QN: <0.1 KUA/L
EGFR: 100 ML/MIN/1.73M2
EGFR: 90 ML/MIN/1.73M2
EOSINOPHIL # BLD AUTO: 0 K/UL
EOSINOPHIL NFR BLD AUTO: 0 %
ESTIMATED AVERAGE GLUCOSE: 111 MG/DL
GLUCOSE SERPL-MCNC: 109 MG/DL
GLUCOSE SERPL-MCNC: 114 MG/DL
GOOSEFOOT IGE QN: <0.1 KUA/L
HBA1C MFR BLD HPLC: 5.5 %
HCT VFR BLD CALC: 43.5 %
HGB BLD-MCNC: 14.2 G/DL
IMM GRANULOCYTES NFR BLD AUTO: 0.2 %
LONDON PLANE IGE QN: <0.1 KUA/L
LYMPHOCYTES # BLD AUTO: 1.31 K/UL
LYMPHOCYTES NFR BLD AUTO: 24.5 %
MAN DIFF?: NORMAL
MCHC RBC-ENTMCNC: 29.2 PG
MCHC RBC-ENTMCNC: 32.6 GM/DL
MCV RBC AUTO: 89.3 FL
MONOCYTES # BLD AUTO: 0.58 K/UL
MONOCYTES NFR BLD AUTO: 10.8 %
MOUSE URINE PROT IGE QN: <0.1 KUA/L
MUGWORT IGE QN: <0.1 KUA/L
MULBERRY (T70) CLASS: 0
MULBERRY (T70) CONC: <0.1 KUA/L
NEUTROPHILS # BLD AUTO: 3.42 K/UL
NEUTROPHILS NFR BLD AUTO: 63.9 %
P NOTATUM IGE QN: <0.1 KUA/L
PLATELET # BLD AUTO: 221 K/UL
POTASSIUM SERPL-SCNC: 4.1 MMOL/L
POTASSIUM SERPL-SCNC: 4.9 MMOL/L
PROT SERPL-MCNC: 7.3 G/DL
RBC # BLD: 4.87 M/UL
RBC # FLD: 13.3 %
RED CEDAR IGE QN: <0.1 KUA/L
ROACH IGE QN: <0.1 KUA/L
SHEEP SORREL IGE QN: <0.1 KUA/L
SILVER BIRCH IGE QN: <0.1 KUA/L
SODIUM SERPL-SCNC: 138 MMOL/L
SODIUM SERPL-SCNC: 140 MMOL/L
TIMOTHY IGE QN: <0.1 KUA/L
TOTAL IGE SMQN RAST: 11 KU/L
TREE ALLERG MIX1 IGE QL: 0
WBC # FLD AUTO: 5.35 K/UL
WHITE ASH IGE QN: <0.1 KUA/L
WHITE ELM IGE QN: 0
WHITE ELM IGE QN: <0.1 KUA/L
WHITE OAK IGE QN: <0.1 KUA/L

## 2022-12-19 PROBLEM — E66.01 OBESITY, CLASS III, BMI 40-49.9 (MORBID OBESITY): Status: ACTIVE | Noted: 2022-11-28

## 2022-12-19 PROBLEM — J98.8 AIRWAY OBSTRUCTION, ANATOMIC: Status: ACTIVE | Noted: 2022-11-28

## 2022-12-19 PROBLEM — J30.89 NON-SEASONAL ALLERGIC RHINITIS DUE TO OTHER ALLERGIC TRIGGER: Status: ACTIVE | Noted: 2020-01-16

## 2022-12-19 NOTE — PHYSICAL EXAM
[No Acute Distress] : no acute distress [Low Lying Soft Palate] : low lying soft palate [Elongated Uvula] : elongated uvula [Enlarged Base of the Tongue] : enlarged base of the tongue [IV] : Mallampati Class: IV [Normal Appearance] : normal appearance [No Neck Mass] : no neck mass [Normal Rate/Rhythm] : normal rate/rhythm [Normal S1, S2] : normal s1, s2 [No Murmurs] : no murmurs [No Resp Distress] : no resp distress [Benign] : benign [Normal Gait] : normal gait [No Clubbing] : no clubbing [No Cyanosis] : no cyanosis [No Edema] : no edema [FROM] : FROM [Normal Color/ Pigmentation] : normal color/ pigmentation [No Focal Deficits] : no focal deficits [Oriented x3] : oriented x3 [Normal Affect] : normal affect [Erythema] : erythema [Nasal congestion] : nasal congestion [No Abnormalities] : no abnormalities [TextBox_11] : perforated nasal septum, crowded o/p [TextBox_68] : slightly diminished BS L base

## 2022-12-19 NOTE — ASSESSMENT
[FreeTextEntry1] : above was discussed at length with the patient and his wife who have an excellent understanding of the issues. I informed the patient that this may be our last visit as I am leaving the practice at the end of December.

## 2022-12-19 NOTE — REVIEW OF SYSTEMS
[Recent Wt Gain (___ Lbs)] : ~T recent [unfilled] lb weight gain [Wheezing] : wheezing [SOB on Exertion] : sob on exertion [Depression] : depression [Anxiety] : anxiety [Obesity] : obesity [Negative] : Endocrine [TextBox_30] : any exertion

## 2022-12-19 NOTE — HISTORY OF PRESENT ILLNESS
[Difficulty Breathing During Exertion] : denies dyspnea on exertion [Feelings Of Weakness On Exertion] : denies exercise intolerance [Cough] : denies coughing [Wheezing] : denies wheezing [Regional Soft Tissue Swelling Both Lower Extremities] : denies lower extremity edema [Chest Pain Or Discomfort] : denies chest pain [Fever] : denies fever [2  -  Slight] : 2, slight [Class II - Mild Symptoms and Slight Limitations] : II [Never] : was never a smoker [Obstructive Sleep Apnea] : obstructive sleep apnea [Snoring] : snoring [Witnessed Apneas] : witnessed sleep apnea [Frequent Nocturnal Awakening] : frequent nocturnal awakening [Daytime Somnolence] : daytime somnolence [ESS: ___] : ESS score [unfilled] [Oxygen] : the patient uses no supplemental oxygen [TextBox_4] : Patient returns w/ his wife Huma on a f/u of his [place]. He has been fully compliant w/ his APAP machine and is benefitting from his usage. He has had the same machine since 2017 and is wondering if he is eligible for a new one. He reports a "burning" odor that happens on rare occasions when the humidifier runs out of water. The last time this occurred was in May. Patient still reports some daytime sleepiness but he is slowly moving his initial sleep time earlier so that he gets more sleep at night. His breathing has been fine but he wants to lose weight because he feels like his weight is "dragging" his breathing down. He has gained 3 lbs since 10/24 and 15 total lbs since his last visit in Feb 2021 (261 -> 276). Patient breathes very loudly sometimes to the point people have asked him if he is okay. + Dust allergy and uses fluticasone occasionally. He reports a venous ulcer in his LE and notes his injuries take an extended time to heal.\par

## 2022-12-19 NOTE — PROCEDURE
[FreeTextEntry1] : APAP Compliance, Date: 10/29/2022 - 11/27/2022\par Usage: 30 days (100% Compliance)\par Usage >= 4 hours: 24 days (80%) \par Average use: 5 hours 14 minutes\par Setting 6.0-20.0 cm H2O (95th percentile: 11.9, Maximum: 13.8)\par Leaks - 95th percentile: 51.3 L/min, Max 73.2 L/min\par AHI: 4.3\par \par APAP Compliance, Date: 01/22/2021 - 02/20/2021\par Usage: 30 days (100% Compliance)\par Usage >= 4 hours: 30 days (100%) \par Setting 6-20 cm H2O (95th percentile: 11.7, Maximum: 13.2)\par Average use: 5 hours 22 minutes\par Leaks - 95th percentile: 45.5 L/min, Max 60.8 L/min\par AHI: 3.1 \par \par APAP [X]  30 day [] 90 day compliance Dates:  [12/13=1/14/2020] \par Settings: [4-20] cm F[]  Andreina [] small [X]Med []  Lg . []  Nasal\par %compliance:[100]  \par Pressure:  95thile%  [10]cm max []cm\par Average use:  [6:00] \par Leak: [] L/ min,  max []L/ min   [] L/ hrs  []min\par AHI = [2] \par  \par HST  4/14/2016 AHI- 69, lowest sat 54%

## 2022-12-27 RX ORDER — SILVER SULFADIAZINE 10 MG/G
1 CREAM TOPICAL TWICE DAILY
Qty: 1 | Refills: 1 | Status: ACTIVE | COMMUNITY
Start: 2022-10-13 | End: 1900-01-01

## 2023-01-02 ENCOUNTER — RX RENEWAL (OUTPATIENT)
Age: 58
End: 2023-01-02

## 2023-01-03 ENCOUNTER — RX RENEWAL (OUTPATIENT)
Age: 58
End: 2023-01-03

## 2023-01-23 ENCOUNTER — APPOINTMENT (OUTPATIENT)
Dept: BREAST CENTER | Facility: CLINIC | Age: 58
End: 2023-01-23
Payer: COMMERCIAL

## 2023-01-23 DIAGNOSIS — D17.1 BENIGN LIPOMATOUS NEOPLASM OF SKIN AND SUBCUTANEOUS TISSUE OF TRUNK: ICD-10-CM

## 2023-01-23 PROCEDURE — 99203 OFFICE O/P NEW LOW 30 MIN: CPT

## 2023-01-25 PROBLEM — D17.1 LIPOMA OF BREAST: Status: ACTIVE | Noted: 2023-01-25

## 2023-01-25 NOTE — HISTORY OF PRESENT ILLNESS
[FreeTextEntry1] : 57-year-old gentleman comes in with about a month for history of a left palpable breast mass on self-examination.  In September he had a mammogram and ultrasound which showed a 1.5 cm fat lobule at the 6 o'clock position of the left breast, 4 cm from the nipple, BI-RADS 2.  Since then the area has not changed.  Patient has 2 maternal aunts with breast cancer and one maternal cousin with breast cancer.  Patient has a family history of having large breasts as they get older in their male family members

## 2023-01-25 NOTE — PHYSICAL EXAM
[No Supraclavicular Adenopathy] : no supraclavicular adenopathy [No Cervical Adenopathy] : no cervical adenopathy [Examined in the supine and seated position] : examined in the supine and seated position [Symmetrical] : symmetrical [No dominant masses] : no dominant masses in right breast  [No Nipple Retraction] : no left nipple retraction [No Nipple Discharge] : no left nipple discharge [No Axillary Lymphadenopathy] : no left axillary lymphadenopathy [de-identified] : In the 6 o'clock position just above the inframammary fold is a 1.5 cm lipoma is freely mobile with no skin fixation.

## 2023-02-02 ENCOUNTER — APPOINTMENT (OUTPATIENT)
Dept: INTERNAL MEDICINE | Facility: CLINIC | Age: 58
End: 2023-02-02
Payer: COMMERCIAL

## 2023-02-02 VITALS
HEART RATE: 76 BPM | HEIGHT: 71.5 IN | DIASTOLIC BLOOD PRESSURE: 88 MMHG | BODY MASS INDEX: 36.43 KG/M2 | OXYGEN SATURATION: 98 % | RESPIRATION RATE: 16 BRPM | SYSTOLIC BLOOD PRESSURE: 132 MMHG | WEIGHT: 266 LBS

## 2023-02-02 DIAGNOSIS — Z23 ENCOUNTER FOR IMMUNIZATION: ICD-10-CM

## 2023-02-02 DIAGNOSIS — L97.909 VARICOSE VEINS OF UNSPECIFIED LOWER EXTREMITY WITH ULCER OF UNSPECIFIED SITE: ICD-10-CM

## 2023-02-02 DIAGNOSIS — I83.009 VARICOSE VEINS OF UNSPECIFIED LOWER EXTREMITY WITH ULCER OF UNSPECIFIED SITE: ICD-10-CM

## 2023-02-02 DIAGNOSIS — Z12.5 ENCOUNTER FOR SCREENING FOR MALIGNANT NEOPLASM OF PROSTATE: ICD-10-CM

## 2023-02-02 DIAGNOSIS — J34.2 DEVIATED NASAL SEPTUM: ICD-10-CM

## 2023-02-02 DIAGNOSIS — Z00.00 ENCOUNTER FOR GENERAL ADULT MEDICAL EXAMINATION W/OUT ABNORMAL FINDINGS: ICD-10-CM

## 2023-02-02 DIAGNOSIS — N52.9 MALE ERECTILE DYSFUNCTION, UNSPECIFIED: ICD-10-CM

## 2023-02-02 DIAGNOSIS — J34.89 OTHER SPECIFIED DISORDERS OF NOSE AND NASAL SINUSES: ICD-10-CM

## 2023-02-02 PROCEDURE — 90686 IIV4 VACC NO PRSV 0.5 ML IM: CPT

## 2023-02-02 PROCEDURE — G0008: CPT

## 2023-02-02 PROCEDURE — G0444 DEPRESSION SCREEN ANNUAL: CPT | Mod: 59

## 2023-02-02 PROCEDURE — 99214 OFFICE O/P EST MOD 30 MIN: CPT | Mod: 25

## 2023-02-02 PROCEDURE — 99396 PREV VISIT EST AGE 40-64: CPT | Mod: 25

## 2023-02-09 PROBLEM — I83.009 VENOUS STASIS ULCER: Status: ACTIVE | Noted: 2022-10-13

## 2023-02-09 PROBLEM — N52.9 ERECTILE DYSFUNCTION: Status: ACTIVE | Noted: 2021-02-01

## 2023-02-09 PROBLEM — J34.2 DEVIATED NASAL SEPTUM: Status: ACTIVE | Noted: 2020-01-16

## 2023-02-09 PROBLEM — Z00.00 ANNUAL PHYSICAL EXAM: Status: ACTIVE | Noted: 2019-11-05

## 2023-02-13 LAB
ALBUMIN SERPL ELPH-MCNC: 5 G/DL
ALP BLD-CCNC: 58 U/L
ALT SERPL-CCNC: 24 U/L
ANION GAP SERPL CALC-SCNC: 12 MMOL/L
APPEARANCE: CLEAR
AST SERPL-CCNC: 25 U/L
BACTERIA: NEGATIVE
BASOPHILS # BLD AUTO: 0.05 K/UL
BASOPHILS NFR BLD AUTO: 0.8 %
BILIRUB SERPL-MCNC: 0.7 MG/DL
BILIRUBIN URINE: NEGATIVE
BLOOD URINE: NEGATIVE
BUN SERPL-MCNC: 20 MG/DL
CALCIUM SERPL-MCNC: 10.4 MG/DL
CHLORIDE SERPL-SCNC: 100 MMOL/L
CHOLEST SERPL-MCNC: 115 MG/DL
CO2 SERPL-SCNC: 28 MMOL/L
COLOR: NORMAL
CREAT SERPL-MCNC: 1.11 MG/DL
EGFR: 78 ML/MIN/1.73M2
EOSINOPHIL # BLD AUTO: 0.08 K/UL
EOSINOPHIL NFR BLD AUTO: 1.3 %
ESTIMATED AVERAGE GLUCOSE: 126 MG/DL
GLUCOSE QUALITATIVE U: NEGATIVE
GLUCOSE SERPL-MCNC: 103 MG/DL
HBA1C MFR BLD HPLC: 6 %
HCT VFR BLD CALC: 45.4 %
HDLC SERPL-MCNC: 35 MG/DL
HGB BLD-MCNC: 15.1 G/DL
HYALINE CASTS: 0 /LPF
IMM GRANULOCYTES NFR BLD AUTO: 0.2 %
KETONES URINE: NEGATIVE
LDLC SERPL CALC-MCNC: 62 MG/DL
LEUKOCYTE ESTERASE URINE: NEGATIVE
LYMPHOCYTES # BLD AUTO: 1.68 K/UL
LYMPHOCYTES NFR BLD AUTO: 28.1 %
MAN DIFF?: NORMAL
MCHC RBC-ENTMCNC: 29 PG
MCHC RBC-ENTMCNC: 33.3 GM/DL
MCV RBC AUTO: 87.1 FL
MICROSCOPIC-UA: NORMAL
MONOCYTES # BLD AUTO: 0.39 K/UL
MONOCYTES NFR BLD AUTO: 6.5 %
NEUTROPHILS # BLD AUTO: 3.76 K/UL
NEUTROPHILS NFR BLD AUTO: 63.1 %
NITRITE URINE: NEGATIVE
NONHDLC SERPL-MCNC: 80 MG/DL
PH URINE: 7
PLATELET # BLD AUTO: 225 K/UL
POTASSIUM SERPL-SCNC: 4.3 MMOL/L
PROT SERPL-MCNC: 7.6 G/DL
PROTEIN URINE: NEGATIVE
PSA FREE FLD-MCNC: 25 %
PSA FREE SERPL-MCNC: 0.16 NG/ML
PSA SERPL-MCNC: 0.65 NG/ML
RBC # BLD: 5.21 M/UL
RBC # FLD: 12.9 %
RED BLOOD CELLS URINE: 1 /HPF
SODIUM SERPL-SCNC: 141 MMOL/L
SPECIFIC GRAVITY URINE: 1.01
SQUAMOUS EPITHELIAL CELLS: 0 /HPF
TRIGL SERPL-MCNC: 91 MG/DL
TSH SERPL-ACNC: 3.22 UIU/ML
UROBILINOGEN URINE: NORMAL
WBC # FLD AUTO: 5.97 K/UL
WHITE BLOOD CELLS URINE: 0 /HPF

## 2023-05-09 ENCOUNTER — APPOINTMENT (OUTPATIENT)
Dept: BREAST CENTER | Facility: CLINIC | Age: 58
End: 2023-05-09

## 2023-08-21 ENCOUNTER — NON-APPOINTMENT (OUTPATIENT)
Age: 58
End: 2023-08-21

## 2023-08-21 ENCOUNTER — APPOINTMENT (OUTPATIENT)
Dept: CARDIOLOGY | Facility: CLINIC | Age: 58
End: 2023-08-21
Payer: COMMERCIAL

## 2023-08-21 VITALS
BODY MASS INDEX: 37.66 KG/M2 | WEIGHT: 275 LBS | HEART RATE: 75 BPM | SYSTOLIC BLOOD PRESSURE: 135 MMHG | DIASTOLIC BLOOD PRESSURE: 85 MMHG | OXYGEN SATURATION: 98 % | HEIGHT: 71.5 IN

## 2023-08-21 DIAGNOSIS — R42 DIZZINESS AND GIDDINESS: ICD-10-CM

## 2023-08-21 PROCEDURE — 99214 OFFICE O/P EST MOD 30 MIN: CPT | Mod: 25

## 2023-08-21 PROCEDURE — 93000 ELECTROCARDIOGRAM COMPLETE: CPT

## 2023-08-21 RX ORDER — AMLODIPINE BESYLATE 10 MG/1
10 TABLET ORAL
Qty: 90 | Refills: 3 | Status: DISCONTINUED | COMMUNITY
Start: 2022-06-16 | End: 2023-08-21

## 2023-08-22 LAB
ESTIMATED AVERAGE GLUCOSE: 131 MG/DL
HBA1C MFR BLD HPLC: 6.2 %

## 2023-09-19 ENCOUNTER — RX RENEWAL (OUTPATIENT)
Age: 58
End: 2023-09-19

## 2023-09-19 RX ORDER — ATORVASTATIN CALCIUM 40 MG/1
40 TABLET, FILM COATED ORAL DAILY
Qty: 90 | Refills: 3 | Status: ACTIVE | COMMUNITY
Start: 2020-12-17 | End: 1900-01-01

## 2023-10-17 ENCOUNTER — RX RENEWAL (OUTPATIENT)
Age: 58
End: 2023-10-17

## 2024-02-06 ENCOUNTER — APPOINTMENT (OUTPATIENT)
Dept: INTERNAL MEDICINE | Facility: CLINIC | Age: 59
End: 2024-02-06

## 2024-02-22 RX ORDER — LOSARTAN POTASSIUM 100 MG/1
100 TABLET, FILM COATED ORAL DAILY
Qty: 90 | Refills: 3 | Status: ACTIVE | COMMUNITY
Start: 2022-10-24 | End: 1900-01-01

## 2024-03-31 PROBLEM — E78.5 DYSLIPIDEMIA: Status: ACTIVE | Noted: 2020-03-12

## 2024-03-31 PROBLEM — R73.03 PREDIABETES: Status: ACTIVE | Noted: 2020-05-18

## 2024-03-31 PROBLEM — G47.33 OBSTRUCTIVE SLEEP APNEA: Status: ACTIVE | Noted: 2019-08-27

## 2024-03-31 PROBLEM — R06.09 DYSPNEA ON EXERTION: Status: ACTIVE | Noted: 2022-01-31

## 2024-03-31 PROBLEM — I25.10 ARTERIOSCLEROTIC HEART DISEASE (ASHD): Status: ACTIVE | Noted: 2019-08-27

## 2024-03-31 PROBLEM — I10 HTN (HYPERTENSION): Status: ACTIVE | Noted: 2019-08-27

## 2024-04-01 ENCOUNTER — APPOINTMENT (OUTPATIENT)
Dept: CARDIOLOGY | Facility: CLINIC | Age: 59
End: 2024-04-01
Payer: COMMERCIAL

## 2024-04-01 ENCOUNTER — NON-APPOINTMENT (OUTPATIENT)
Age: 59
End: 2024-04-01

## 2024-04-01 VITALS
DIASTOLIC BLOOD PRESSURE: 80 MMHG | HEART RATE: 76 BPM | OXYGEN SATURATION: 99 % | WEIGHT: 280 LBS | BODY MASS INDEX: 38.34 KG/M2 | SYSTOLIC BLOOD PRESSURE: 138 MMHG | HEIGHT: 71.5 IN

## 2024-04-01 DIAGNOSIS — R73.03 PREDIABETES.: ICD-10-CM

## 2024-04-01 DIAGNOSIS — I25.10 ATHEROSCLEROTIC HEART DISEASE OF NATIVE CORONARY ARTERY W/OUT ANGINA PECTORIS: ICD-10-CM

## 2024-04-01 DIAGNOSIS — G47.33 OBSTRUCTIVE SLEEP APNEA (ADULT) (PEDIATRIC): ICD-10-CM

## 2024-04-01 DIAGNOSIS — E78.5 HYPERLIPIDEMIA, UNSPECIFIED: ICD-10-CM

## 2024-04-01 DIAGNOSIS — I10 ESSENTIAL (PRIMARY) HYPERTENSION: ICD-10-CM

## 2024-04-01 DIAGNOSIS — R06.09 OTHER FORMS OF DYSPNEA: ICD-10-CM

## 2024-04-01 PROCEDURE — 99214 OFFICE O/P EST MOD 30 MIN: CPT | Mod: 25

## 2024-04-01 PROCEDURE — 93000 ELECTROCARDIOGRAM COMPLETE: CPT

## 2024-04-09 ENCOUNTER — RESULT REVIEW (OUTPATIENT)
Age: 59
End: 2024-04-09

## 2024-04-11 ENCOUNTER — APPOINTMENT (OUTPATIENT)
Dept: ENDOCRINOLOGY | Facility: CLINIC | Age: 59
End: 2024-04-11

## 2024-05-22 RX ORDER — HYDROCHLOROTHIAZIDE 25 MG/1
25 TABLET ORAL
Qty: 90 | Refills: 3 | Status: ACTIVE | COMMUNITY
Start: 2022-10-24 | End: 1900-01-01

## 2024-06-17 RX ORDER — TADALAFIL 5 MG/1
5 TABLET ORAL
Qty: 30 | Refills: 3 | Status: ACTIVE | COMMUNITY
Start: 2021-03-29 | End: 1900-01-01

## 2024-10-16 ENCOUNTER — RX RENEWAL (OUTPATIENT)
Age: 59
End: 2024-10-16

## 2025-01-10 ENCOUNTER — RX RENEWAL (OUTPATIENT)
Age: 60
End: 2025-01-10

## 2025-01-23 ENCOUNTER — RX RENEWAL (OUTPATIENT)
Age: 60
End: 2025-01-23

## 2025-04-30 ENCOUNTER — APPOINTMENT (OUTPATIENT)
Dept: CARDIOLOGY | Facility: CLINIC | Age: 60
End: 2025-04-30
Payer: COMMERCIAL

## 2025-04-30 ENCOUNTER — NON-APPOINTMENT (OUTPATIENT)
Age: 60
End: 2025-04-30

## 2025-04-30 VITALS
WEIGHT: 274 LBS | BODY MASS INDEX: 37.52 KG/M2 | HEIGHT: 71.5 IN | DIASTOLIC BLOOD PRESSURE: 90 MMHG | OXYGEN SATURATION: 97 % | HEART RATE: 85 BPM | SYSTOLIC BLOOD PRESSURE: 132 MMHG

## 2025-04-30 DIAGNOSIS — I10 ESSENTIAL (PRIMARY) HYPERTENSION: ICD-10-CM

## 2025-04-30 DIAGNOSIS — I25.10 ATHEROSCLEROTIC HEART DISEASE OF NATIVE CORONARY ARTERY W/OUT ANGINA PECTORIS: ICD-10-CM

## 2025-04-30 PROCEDURE — 99215 OFFICE O/P EST HI 40 MIN: CPT | Mod: 25

## 2025-04-30 PROCEDURE — 93000 ELECTROCARDIOGRAM COMPLETE: CPT

## 2025-04-30 RX ORDER — MULTIVITAMIN/IRON/FOLIC ACID 18MG-0.4MG
TABLET ORAL
Refills: 0 | Status: ACTIVE | COMMUNITY

## 2025-04-30 RX ORDER — METFORMIN HYDROCHLORIDE 500 MG/1
500 TABLET, COATED ORAL
Refills: 0 | Status: ACTIVE | COMMUNITY

## 2025-05-14 ENCOUNTER — RESULT REVIEW (OUTPATIENT)
Age: 60
End: 2025-05-14